# Patient Record
Sex: FEMALE | Race: WHITE | Employment: OTHER | URBAN - NONMETROPOLITAN AREA
[De-identification: names, ages, dates, MRNs, and addresses within clinical notes are randomized per-mention and may not be internally consistent; named-entity substitution may affect disease eponyms.]

---

## 2017-02-21 ENCOUNTER — TELEPHONE (OUTPATIENT)
Dept: PULMONOLOGY | Age: 82
End: 2017-02-21

## 2017-03-02 PROBLEM — J18.9 PNEUMONIA: Status: ACTIVE | Noted: 2017-03-02

## 2017-03-02 PROBLEM — M41.9 RESTRICTIVE LUNG DISEASE DUE TO KYPHOSCOLIOSIS: Status: ACTIVE | Noted: 2017-03-02

## 2017-03-02 PROBLEM — J98.4 RESTRICTIVE LUNG DISEASE DUE TO KYPHOSCOLIOSIS: Status: ACTIVE | Noted: 2017-03-02

## 2017-03-02 PROBLEM — R06.02 SOB (SHORTNESS OF BREATH): Status: ACTIVE | Noted: 2017-03-02

## 2017-03-03 PROBLEM — J18.9 PNEUMONIA: Status: RESOLVED | Noted: 2017-03-02 | Resolved: 2017-03-03

## 2017-03-03 PROBLEM — R06.02 SOB (SHORTNESS OF BREATH): Status: RESOLVED | Noted: 2017-03-02 | Resolved: 2017-03-03

## 2017-07-06 PROBLEM — G93.89 BRAIN MASS: Status: ACTIVE | Noted: 2017-07-06

## 2017-07-06 PROBLEM — R51.9 HEADACHE: Status: ACTIVE | Noted: 2017-07-06

## 2017-07-11 ENCOUNTER — TELEPHONE (OUTPATIENT)
Dept: ADMINISTRATIVE | Age: 82
End: 2017-07-11

## 2017-07-16 ENCOUNTER — HOSPITAL ENCOUNTER (EMERGENCY)
Age: 82
Discharge: HOME | End: 2017-07-17
Payer: MEDICARE

## 2017-07-16 ENCOUNTER — APPOINTMENT (OUTPATIENT)
Dept: GENERAL RADIOLOGY | Age: 82
End: 2017-07-16
Payer: MEDICARE

## 2017-07-16 DIAGNOSIS — R07.9 CHEST PAIN, UNSPECIFIED TYPE: ICD-10-CM

## 2017-07-16 DIAGNOSIS — E87.5 HYPERKALEMIA: Primary | ICD-10-CM

## 2017-07-16 PROCEDURE — 82248 BILIRUBIN DIRECT: CPT

## 2017-07-16 PROCEDURE — 83690 ASSAY OF LIPASE: CPT

## 2017-07-16 PROCEDURE — 71010 XR CHEST PORTABLE: CPT

## 2017-07-16 PROCEDURE — 85025 COMPLETE CBC W/AUTO DIFF WBC: CPT

## 2017-07-16 PROCEDURE — 93005 ELECTROCARDIOGRAM TRACING: CPT | Performed by: NURSE PRACTITIONER

## 2017-07-16 PROCEDURE — 80053 COMPREHEN METABOLIC PANEL: CPT

## 2017-07-16 PROCEDURE — 84484 ASSAY OF TROPONIN QUANT: CPT

## 2017-07-16 PROCEDURE — 99285 EMERGENCY DEPT VISIT HI MDM: CPT

## 2017-07-16 PROCEDURE — 36415 COLL VENOUS BLD VENIPUNCTURE: CPT

## 2017-07-16 ASSESSMENT — PAIN DESCRIPTION - PAIN TYPE: TYPE: ACUTE PAIN

## 2017-07-16 ASSESSMENT — PAIN DESCRIPTION - FREQUENCY: FREQUENCY: CONTINUOUS

## 2017-07-16 ASSESSMENT — PAIN DESCRIPTION - DESCRIPTORS: DESCRIPTORS: PRESSURE

## 2017-07-16 ASSESSMENT — PAIN DESCRIPTION - LOCATION: LOCATION: CHEST

## 2017-07-16 ASSESSMENT — PAIN SCALES - GENERAL: PAINLEVEL_OUTOF10: 5

## 2017-07-16 ASSESSMENT — PAIN DESCRIPTION - ORIENTATION: ORIENTATION: RIGHT

## 2017-07-17 ENCOUNTER — APPOINTMENT (OUTPATIENT)
Dept: ULTRASOUND IMAGING | Age: 82
End: 2017-07-17
Payer: MEDICARE

## 2017-07-17 VITALS
TEMPERATURE: 98.4 F | WEIGHT: 86 LBS | RESPIRATION RATE: 18 BRPM | SYSTOLIC BLOOD PRESSURE: 154 MMHG | HEART RATE: 96 BPM | DIASTOLIC BLOOD PRESSURE: 92 MMHG | HEIGHT: 64 IN | OXYGEN SATURATION: 98 % | BODY MASS INDEX: 14.68 KG/M2

## 2017-07-17 LAB
ALBUMIN SERPL-MCNC: 3.5 G/DL (ref 3.5–5.1)
ALP BLD-CCNC: 93 U/L (ref 38–126)
ALT SERPL-CCNC: 17 U/L (ref 11–66)
ANION GAP SERPL CALCULATED.3IONS-SCNC: 14 MEQ/L (ref 8–16)
ANISOCYTOSIS: ABNORMAL
AST SERPL-CCNC: 32 U/L (ref 5–40)
BASOPHILS # BLD: 0.1 %
BASOPHILS ABSOLUTE: 0 THOU/MM3 (ref 0–0.1)
BILIRUB SERPL-MCNC: 0.2 MG/DL (ref 0.3–1.2)
BILIRUBIN DIRECT: < 0.2 MG/DL (ref 0–0.3)
BUN BLDV-MCNC: 24 MG/DL (ref 7–22)
CALCIUM SERPL-MCNC: 8.9 MG/DL (ref 8.5–10.5)
CHLORIDE BLD-SCNC: 98 MEQ/L (ref 98–111)
CO2: 25 MEQ/L (ref 23–33)
CREAT SERPL-MCNC: 0.9 MG/DL (ref 0.4–1.2)
EKG ATRIAL RATE: 153 BPM
EKG Q-T INTERVAL: 348 MS
EKG QRS DURATION: 76 MS
EKG QTC CALCULATION (BAZETT): 455 MS
EKG R AXIS: 73 DEGREES
EKG T AXIS: 61 DEGREES
EKG VENTRICULAR RATE: 103 BPM
EOSINOPHIL # BLD: 2.3 %
EOSINOPHILS ABSOLUTE: 0.2 THOU/MM3 (ref 0–0.4)
GFR SERPL CREATININE-BSD FRML MDRD: 58 ML/MIN/1.73M2
GLUCOSE BLD-MCNC: 99 MG/DL (ref 70–108)
HCT VFR BLD CALC: 36.3 % (ref 37–47)
HEMOGLOBIN: 12.2 GM/DL (ref 12–16)
LIPASE: 78.4 U/L (ref 5.6–51.3)
LYMPHOCYTES # BLD: 10.2 %
LYMPHOCYTES ABSOLUTE: 1 THOU/MM3 (ref 1–4.8)
MCH RBC QN AUTO: 31.2 PG (ref 27–31)
MCHC RBC AUTO-ENTMCNC: 33.5 GM/DL (ref 33–37)
MCV RBC AUTO: 93.2 FL (ref 81–99)
MONOCYTES # BLD: 8.4 %
MONOCYTES ABSOLUTE: 0.8 THOU/MM3 (ref 0.4–1.3)
NUCLEATED RED BLOOD CELLS: 0 /100 WBC
PDW BLD-RTO: 15.4 % (ref 11.5–14.5)
PLATELET # BLD: 200 THOU/MM3 (ref 130–400)
PMV BLD AUTO: 8.2 MCM (ref 7.4–10.4)
POTASSIUM SERPL-SCNC: 5.3 MEQ/L (ref 3.5–5.2)
RBC # BLD: 3.9 MILL/MM3 (ref 4.2–5.4)
RBC # BLD: NORMAL 10*6/UL
SEG NEUTROPHILS: 79 %
SEGMENTED NEUTROPHILS ABSOLUTE COUNT: 7.9 THOU/MM3 (ref 1.8–7.7)
SODIUM BLD-SCNC: 137 MEQ/L (ref 135–145)
TOTAL PROTEIN: 6.6 G/DL (ref 6.1–8)
TROPONIN T: < 0.01 NG/ML
TROPONIN T: < 0.01 NG/ML
WBC # BLD: 10 THOU/MM3 (ref 4.8–10.8)

## 2017-07-17 PROCEDURE — 96374 THER/PROPH/DIAG INJ IV PUSH: CPT

## 2017-07-17 PROCEDURE — 36415 COLL VENOUS BLD VENIPUNCTURE: CPT

## 2017-07-17 PROCEDURE — 6360000002 HC RX W HCPCS

## 2017-07-17 PROCEDURE — 84484 ASSAY OF TROPONIN QUANT: CPT

## 2017-07-17 PROCEDURE — 76705 ECHO EXAM OF ABDOMEN: CPT

## 2017-07-17 RX ORDER — MORPHINE SULFATE 2 MG/ML
1 INJECTION, SOLUTION INTRAMUSCULAR; INTRAVENOUS ONCE
Status: COMPLETED | OUTPATIENT
Start: 2017-07-17 | End: 2017-07-17

## 2017-07-17 RX ORDER — MORPHINE SULFATE 2 MG/ML
INJECTION, SOLUTION INTRAMUSCULAR; INTRAVENOUS
Status: COMPLETED
Start: 2017-07-17 | End: 2017-07-17

## 2017-07-17 RX ADMIN — MORPHINE SULFATE 1 MG: 2 INJECTION, SOLUTION INTRAMUSCULAR; INTRAVENOUS at 01:06

## 2017-07-17 ASSESSMENT — ENCOUNTER SYMPTOMS
VOMITING: 0
WHEEZING: 0
COUGH: 0
EYE PAIN: 0
SHORTNESS OF BREATH: 1
SORE THROAT: 0
DIARRHEA: 0
ABDOMINAL PAIN: 1
NAUSEA: 0
RHINORRHEA: 0
BACK PAIN: 1
EYE DISCHARGE: 0

## 2017-07-17 ASSESSMENT — PAIN SCALES - GENERAL: PAINLEVEL_OUTOF10: 5

## 2017-09-11 ENCOUNTER — HOSPITAL ENCOUNTER (OUTPATIENT)
Dept: MRI IMAGING | Age: 82
Discharge: HOME | End: 2017-09-11
Payer: MEDICARE

## 2017-09-11 DIAGNOSIS — G93.9 CEREBELLAR LESION: ICD-10-CM

## 2017-09-11 DIAGNOSIS — G93.89 BRAIN MASS: ICD-10-CM

## 2017-09-11 DIAGNOSIS — G93.9 CEREBRAL LESION: ICD-10-CM

## 2017-09-11 PROCEDURE — A9579 GAD-BASE MR CONTRAST NOS,1ML: HCPCS | Performed by: NEUROLOGICAL SURGERY

## 2017-09-11 PROCEDURE — 6360000004 HC RX CONTRAST MEDICATION: Performed by: NEUROLOGICAL SURGERY

## 2017-09-11 PROCEDURE — 70553 MRI BRAIN STEM W/O & W/DYE: CPT

## 2017-09-11 RX ADMIN — GADOTERIDOL: 279.3 INJECTION, SOLUTION INTRAVENOUS at 11:09

## 2017-10-01 PROBLEM — G93.9 CEREBELLAR LESION: Status: ACTIVE | Noted: 2017-07-06

## 2017-10-16 ENCOUNTER — OFFICE VISIT (OUTPATIENT)
Dept: PULMONOLOGY | Age: 82
End: 2017-10-16
Payer: MEDICARE

## 2017-10-16 VITALS
BODY MASS INDEX: 15.59 KG/M2 | HEART RATE: 57 BPM | SYSTOLIC BLOOD PRESSURE: 140 MMHG | OXYGEN SATURATION: 94 % | WEIGHT: 88 LBS | RESPIRATION RATE: 12 BRPM | HEIGHT: 63 IN | DIASTOLIC BLOOD PRESSURE: 74 MMHG

## 2017-10-16 DIAGNOSIS — R91.8 LUNG NODULE, MULTIPLE: ICD-10-CM

## 2017-10-16 DIAGNOSIS — R09.82 PND (POST-NASAL DRIP): ICD-10-CM

## 2017-10-16 DIAGNOSIS — J47.9 BRONCHIECTASIS WITHOUT COMPLICATION (HCC): Primary | ICD-10-CM

## 2017-10-16 DIAGNOSIS — J98.4 RESTRICTIVE LUNG DISEASE DUE TO KYPHOSCOLIOSIS: ICD-10-CM

## 2017-10-16 DIAGNOSIS — J41.1 MUCOPURULENT CHRONIC BRONCHITIS (HCC): ICD-10-CM

## 2017-10-16 DIAGNOSIS — M41.9 RESTRICTIVE LUNG DISEASE DUE TO KYPHOSCOLIOSIS: ICD-10-CM

## 2017-10-16 PROBLEM — R51.9 HEADACHE: Status: RESOLVED | Noted: 2017-07-06 | Resolved: 2017-10-16

## 2017-10-16 PROCEDURE — 99213 OFFICE O/P EST LOW 20 MIN: CPT | Performed by: PHYSICIAN ASSISTANT

## 2017-10-16 RX ORDER — GUAIFENESIN 600 MG/1
1200 TABLET, EXTENDED RELEASE ORAL 2 TIMES DAILY
Qty: 120 TABLET | Refills: 3 | Status: SHIPPED | OUTPATIENT
Start: 2017-10-16

## 2017-10-16 RX ORDER — FLUTICASONE PROPIONATE 50 MCG
1 SPRAY, SUSPENSION (ML) NASAL DAILY
Qty: 1 BOTTLE | Refills: 6 | Status: SHIPPED | OUTPATIENT
Start: 2017-10-16 | End: 2018-05-29 | Stop reason: ALTCHOICE

## 2017-10-16 ASSESSMENT — ENCOUNTER SYMPTOMS
EYES NEGATIVE: 1
GASTROINTESTINAL NEGATIVE: 1
HEMOPTYSIS: 0
VOMITING: 0
NAUSEA: 0
SPUTUM PRODUCTION: 1
WHEEZING: 0
COUGH: 1
HEARTBURN: 0
SHORTNESS OF BREATH: 0
SORE THROAT: 0
BACK PAIN: 0

## 2017-10-16 NOTE — PROGRESS NOTES
heard.  Pulmonary/Chest: Effort normal. No respiratory distress. She has no wheezes. She has no rales. Diminished    Abdominal: Bowel sounds are normal. She exhibits no distension. There is no tenderness. Musculoskeletal: Normal range of motion. She exhibits no edema or tenderness. Kyphoscoliosis    Neurological: She is alert and oriented to person, place, and time. Skin: Skin is warm and dry. No rash noted. Psychiatric: Affect and judgment normal.   Nursing note and vitals reviewed. Test results   CT chest Jul 7, 2017  1. COPD 2. Several scattered subcentimeter nodules in the lungs as detailed above in addition to the more focal area of atelectasis scar or mass in the medial left base. Somewhat favor this is atelectasis. 3. Reticular nodular pattern in the lungs particularly the lower lobes. This is suspicious for an acute inflammatory infectious process. 4. Bronchiectasis and chronic bronchitic changes in both lower lungs. Assessment     1. Bronchiectasis without complication ([address]  CT CHEST WO CONTRAST   2. Restrictive lung disease due to kyphoscoliosis  CT CHEST WO CONTRAST   3. Lung nodule, multiple     4.  PND (post-nasal drip)           Plan   Will add acapella to assist with sputum production- she has failed Mucinex alone and has muscle weakness  Will use Mucinex with Acapella  Repeat CT chest in 3 months to ensure stability of changes in LLL  Will use Flonase for PND  Will see Oswaldo N[facility] back in: 3 months    Libertad Summers  10/16/2017

## 2018-01-31 ENCOUNTER — HOSPITAL ENCOUNTER (OUTPATIENT)
Dept: MRI IMAGING | Age: 83
Discharge: HOME | End: 2018-01-31
Payer: MEDICARE

## 2018-01-31 DIAGNOSIS — D18.02: ICD-10-CM

## 2018-01-31 DIAGNOSIS — R51.9 INTRACTABLE EPISODIC HEADACHE, UNSPECIFIED HEADACHE TYPE: ICD-10-CM

## 2018-01-31 PROCEDURE — 70551 MRI BRAIN STEM W/O DYE: CPT

## 2018-03-07 ENCOUNTER — HOSPITAL ENCOUNTER (OUTPATIENT)
Dept: CT IMAGING | Age: 83
Discharge: HOME | End: 2018-03-07
Payer: MEDICARE

## 2018-03-07 DIAGNOSIS — J98.4 RESTRICTIVE LUNG DISEASE DUE TO KYPHOSCOLIOSIS: ICD-10-CM

## 2018-03-07 DIAGNOSIS — M41.9 RESTRICTIVE LUNG DISEASE DUE TO KYPHOSCOLIOSIS: ICD-10-CM

## 2018-03-07 DIAGNOSIS — J47.9 BRONCHIECTASIS WITHOUT COMPLICATION (HCC): ICD-10-CM

## 2018-03-07 PROCEDURE — 71250 CT THORAX DX C-: CPT

## 2018-03-27 ENCOUNTER — OFFICE VISIT (OUTPATIENT)
Dept: PULMONOLOGY | Age: 83
End: 2018-03-27
Payer: MEDICARE

## 2018-03-27 VITALS
WEIGHT: 92 LBS | HEART RATE: 70 BPM | BODY MASS INDEX: 16.3 KG/M2 | DIASTOLIC BLOOD PRESSURE: 94 MMHG | SYSTOLIC BLOOD PRESSURE: 152 MMHG | OXYGEN SATURATION: 93 % | HEIGHT: 63 IN

## 2018-03-27 DIAGNOSIS — J41.1 MUCOPURULENT CHRONIC BRONCHITIS (HCC): Primary | ICD-10-CM

## 2018-03-27 DIAGNOSIS — J47.9 BRONCHIECTASIS WITHOUT COMPLICATION (HCC): ICD-10-CM

## 2018-03-27 DIAGNOSIS — R91.8 LUNG MASS: ICD-10-CM

## 2018-03-27 DIAGNOSIS — M41.9 RESTRICTIVE LUNG DISEASE DUE TO KYPHOSCOLIOSIS: ICD-10-CM

## 2018-03-27 DIAGNOSIS — J98.4 RESTRICTIVE LUNG DISEASE DUE TO KYPHOSCOLIOSIS: ICD-10-CM

## 2018-03-27 PROCEDURE — 99213 OFFICE O/P EST LOW 20 MIN: CPT | Performed by: PHYSICIAN ASSISTANT

## 2018-03-27 RX ORDER — METOPROLOL TARTRATE 50 MG/1
50 TABLET, FILM COATED ORAL 2 TIMES DAILY
COMMUNITY

## 2018-03-27 RX ORDER — BENZONATATE 200 MG/1
200 CAPSULE ORAL 3 TIMES DAILY PRN
COMMUNITY
End: 2018-05-29 | Stop reason: ALTCHOICE

## 2018-03-27 RX ORDER — LOSARTAN POTASSIUM 50 MG/1
50 TABLET ORAL DAILY
Status: ON HOLD | COMMUNITY
End: 2018-06-10 | Stop reason: HOSPADM

## 2018-03-27 RX ORDER — LORATADINE 10 MG/1
10 TABLET ORAL DAILY
COMMUNITY

## 2018-03-27 ASSESSMENT — ENCOUNTER SYMPTOMS
SHORTNESS OF BREATH: 1
WHEEZING: 1
SINUS PAIN: 0
NAUSEA: 0
SORE THROAT: 0
HEARTBURN: 0
ORTHOPNEA: 0
GASTROINTESTINAL NEGATIVE: 1
EYES NEGATIVE: 1
COUGH: 1
SPUTUM PRODUCTION: 1

## 2018-03-27 NOTE — PROGRESS NOTES
[place] [facility]    Patient: Shaheed Jimenez, 80 y.o.   : 1924  3/27/2018    Pt of Dr. Greg Campos   Patient presents with    COPD     3-4 mo f/u CT 3/7/18        HPI  Quince Gitelman is here for follow up for bronchiectasis. She haley been having issues with cough and sputum since Nov. She was treated with 2 rounds of ATB and just in the past 2 weeks she has felt better. She was suppose to start Acapella after last visit but did not fill script. She feels jittery if uses albuterol. Past Medical hx   PMH:  Past Medical History:   Diagnosis Date    Bronchiectasis ([address]     GERD (gastroesophageal reflux disease)     Hypertension     Peptic ulcer disease     Pneumonia     Scoliosis      SURGICAL HISTORY:  Past Surgical History:   Procedure Laterality Date    APPENDECTOMY      BACK SURGERY      lumbar    COLONOSCOPY      ENDOSCOPY, COLON, DIAGNOSTIC      EYE SURGERY      bilateral cataracts    GASTRECTOMY      partial    HYSTERECTOMY       SOCIAL HISTORY:  Social History   Substance Use Topics    Smoking status: Never Smoker    Smokeless tobacco: Never Used    Alcohol use No     ALLERGIES:No Known Allergies  FAMILY HISTORY:  Family History   Problem Relation Age of Onset    Asthma Child      CURRENT MEDICATIONS:  Current Outpatient Prescriptions   Medication Sig Dispense Refill    benzonatate (TESSALON) 200 MG capsule Take 200 mg by mouth 3 times daily as needed for Cough      Multiple Vitamins-Minerals (PRESERVISION AREDS 2 PO) Take by mouth      loratadine (CLARITIN) 10 MG tablet Take 10 mg by mouth daily      metoprolol tartrate (LOPRESSOR) 50 MG tablet Take 50 mg by mouth 2 times daily      losartan (COZAAR) 50 MG tablet Take 50 mg by mouth daily      Misc.  Devices (ACAPELLA) MISC 1 Device by Does not apply route 4 times daily 1 each 0    mometasone (ASMANEX 120 METERED DOSES) 220 MCG/INH inhaler Inhale 2 puffs into the lungs daily 1 Inhaler 11    fluticasone (FLONASE) 50 MCG/ACT nasal spray 1 spray by Nasal route daily 1 Bottle 6    guaiFENesin (MUCINEX) 600 MG extended release tablet Take 2 tablets by mouth 2 times daily (Patient taking differently: Take 1,200 mg by mouth daily ) 120 tablet 3    desoximetasone (TOPICORT) 0.25 % cream as needed Apply topically 2 times daily.  mometasone (ELOCON) 0.1 % lotion Apply 1 drop topically daily as needed to scalp      triamcinolone (KENALOG) 0.025 % LOTN lotion Apply topically 4 times daily as needed       omeprazole (PRILOSEC) 20 MG capsule Take 20 mg by mouth daily       Wheat Dextrin (BENEFIBER PO) Take by mouth daily as needed       aspirin 81 MG tablet Take 81 mg by mouth daily.  Probiotic Product (PROBIOTIC DAILY PO) Take  by mouth.  b complex vitamins capsule Take 1 capsule by mouth daily. No current facility-administered medications for this visit. Oralia JEFF   Review of Systems   Constitutional: Negative. Negative for chills and fever. HENT: Negative. Negative for congestion, nosebleeds, sinus pain and sore throat. Eyes: Negative. Respiratory: Positive for cough, sputum production, shortness of breath and wheezing. Cardiovascular: Negative. Negative for chest pain, orthopnea and PND. Gastrointestinal: Negative. Negative for heartburn and nausea. Genitourinary: Negative. Musculoskeletal: Negative. Negative for joint pain and myalgias. Skin: Negative. Neurological: Negative. Negative for dizziness, weakness and headaches. Endo/Heme/Allergies: Negative. Psychiatric/Behavioral: Negative. Negative for depression. The patient is not nervous/anxious and does not have insomnia. All other systems reviewed and are negative.        Physical exam   BP (!) 152/94 (Site: Left Arm, Position: Sitting)   Pulse 70   Ht 5' 3\" (1.6 m)   Wt 92 lb (41.7 kg)   SpO2 93%   BMI 16.30 kg/m²    Neck Circumference -   13  Mallampati - 1    Physical Exam   Constitutional: She is oriented to person, place, and time. No distress. frail   HENT:   Head: Normocephalic and atraumatic. Mouth/Throat: Oropharynx is clear and moist. No oropharyngeal exudate. Eyes: Pupils are equal, round, and reactive to light. Neck: Normal range of motion. Neck supple. Cardiovascular: Normal rate, regular rhythm and normal heart sounds. Pulmonary/Chest: Effort normal. No stridor. No respiratory distress. Squeaks and rhonchi in the bases. Abdominal: Soft. Bowel sounds are normal.   Musculoskeletal: Normal range of motion. She exhibits no edema. Kyphoscoliosis    Neurological: She is alert and oriented to person, place, and time. Gait normal.   Skin: Skin is warm and dry. She is not diaphoretic. Psychiatric: Mood, memory, affect and judgment normal.        Test results   CT chest 3/07/2018  1. There is bronchiectasis demonstrated at the upper and lower lung zones bilaterally. 2. Focal ovoid opacity at the medial left lung base measuring 3.3 x 2.1 cm appears increased in size from the prior examination dated 7/7/2017 previously measuring 3 x 1.8 cm. Differential considerations would include focal round atelectasis versus an inflammatory or infectious process or a neoplastic process. Further evaluation can be obtained with PET/CT and/or biopsy. 3. There is an irregular confluent opacity at the medial right lung base which measures 1.3 x 1.9 cm. This is indeterminate in etiology and may represent an inflammatory or infectious process. However, recommend continued follow-up to document resolution. 4. More focal dependent consolidative airspace disease at the right lung base may represent atelectasis or pneumonia. 5. Scattered small micronodular densities are demonstrated at the lower lobes bilaterally. This may be related to an inflammatory or infectious process to include an atypical mycobacterial infectious process.    6. There is focal opacity

## 2018-05-29 ENCOUNTER — APPOINTMENT (OUTPATIENT)
Dept: GENERAL RADIOLOGY | Age: 83
DRG: 194 | End: 2018-05-29
Payer: MEDICARE

## 2018-05-29 ENCOUNTER — APPOINTMENT (OUTPATIENT)
Dept: CT IMAGING | Age: 83
DRG: 194 | End: 2018-05-29
Payer: MEDICARE

## 2018-05-29 ENCOUNTER — HOSPITAL ENCOUNTER (INPATIENT)
Age: 83
LOS: 3 days | Discharge: NURSING, REHAB OR LONG-TERM CARE | DRG: 194 | End: 2018-06-01
Attending: FAMILY MEDICINE | Admitting: INTERNAL MEDICINE
Payer: MEDICARE

## 2018-05-29 DIAGNOSIS — J18.9 PNEUMONIA DUE TO ORGANISM: ICD-10-CM

## 2018-05-29 DIAGNOSIS — E87.1 HYPONATREMIA: Primary | ICD-10-CM

## 2018-05-29 LAB
ALBUMIN SERPL-MCNC: 3.5 G/DL (ref 3.5–5.1)
ALP BLD-CCNC: 110 U/L (ref 38–126)
ALT SERPL-CCNC: 11 U/L (ref 11–66)
ANION GAP SERPL CALCULATED.3IONS-SCNC: 13 MEQ/L (ref 8–16)
ANISOCYTOSIS: ABNORMAL
AST SERPL-CCNC: 23 U/L (ref 5–40)
BASOPHILS # BLD: 0.3 %
BASOPHILS ABSOLUTE: 0 THOU/MM3 (ref 0–0.1)
BILIRUB SERPL-MCNC: 0.4 MG/DL (ref 0.3–1.2)
BILIRUBIN DIRECT: < 0.2 MG/DL (ref 0–0.3)
BUN BLDV-MCNC: 21 MG/DL (ref 7–22)
CALCIUM SERPL-MCNC: 8.7 MG/DL (ref 8.5–10.5)
CHLORIDE BLD-SCNC: 85 MEQ/L (ref 98–111)
CO2: 22 MEQ/L (ref 23–33)
CREAT SERPL-MCNC: 1.2 MG/DL (ref 0.4–1.2)
CREATININE URINE: 112.9 MG/DL
EKG ATRIAL RATE: 90 BPM
EKG P AXIS: 88 DEGREES
EKG P-R INTERVAL: 152 MS
EKG Q-T INTERVAL: 348 MS
EKG QRS DURATION: 86 MS
EKG QTC CALCULATION (BAZETT): 425 MS
EKG R AXIS: 68 DEGREES
EKG T AXIS: 69 DEGREES
EKG VENTRICULAR RATE: 90 BPM
EOSINOPHIL # BLD: 0.1 %
EOSINOPHILS ABSOLUTE: 0 THOU/MM3 (ref 0–0.4)
GFR SERPL CREATININE-BSD FRML MDRD: 42 ML/MIN/1.73M2
GLUCOSE BLD-MCNC: 124 MG/DL (ref 70–108)
HCT VFR BLD CALC: 39.1 % (ref 37–47)
HEMOGLOBIN: 13.3 GM/DL (ref 12–16)
LACTIC ACID: 1.1 MMOL/L (ref 0.5–2.2)
LV EF: 70 %
LVEF MODALITY: NORMAL
LYMPHOCYTES # BLD: 4.7 %
LYMPHOCYTES ABSOLUTE: 0.5 THOU/MM3 (ref 1–4.8)
MCH RBC QN AUTO: 29.4 PG (ref 27–31)
MCHC RBC AUTO-ENTMCNC: 33.9 GM/DL (ref 33–37)
MCV RBC AUTO: 86.6 FL (ref 81–99)
MONOCYTES # BLD: 7.9 %
MONOCYTES ABSOLUTE: 0.8 THOU/MM3 (ref 0.4–1.3)
NUCLEATED RED BLOOD CELLS: 0 /100 WBC
OSMOLALITY CALCULATION: 246.6 MOSMOL/KG (ref 275–300)
OSMOLALITY: 272 MOSMOL/KG (ref 275–295)
PDW BLD-RTO: 14.6 % (ref 11.5–14.5)
PLATELET # BLD: 205 THOU/MM3 (ref 130–400)
PMV BLD AUTO: 7.9 FL (ref 7.4–10.4)
POTASSIUM SERPL-SCNC: 5.8 MEQ/L (ref 3.5–5.2)
PRO-BNP: 1417 PG/ML (ref 0–1800)
PROCALCITONIN: 0.11 NG/ML (ref 0.01–0.09)
RBC # BLD: 4.52 MILL/MM3 (ref 4.2–5.4)
SEG NEUTROPHILS: 87 %
SEGMENTED NEUTROPHILS ABSOLUTE COUNT: 9.3 THOU/MM3 (ref 1.8–7.7)
SODIUM BLD-SCNC: 120 MEQ/L (ref 135–145)
SODIUM URINE: 41 MEQ/L
TOTAL PROTEIN: 7 G/DL (ref 6.1–8)
TROPONIN T: < 0.01 NG/ML
URIC ACID: 5 MG/DL (ref 2.4–5.7)
WBC # BLD: 10.7 THOU/MM3 (ref 4.8–10.8)

## 2018-05-29 PROCEDURE — 89190 NASAL SMEAR FOR EOSINOPHILS: CPT

## 2018-05-29 PROCEDURE — 83880 ASSAY OF NATRIURETIC PEPTIDE: CPT

## 2018-05-29 PROCEDURE — 1200000003 HC TELEMETRY R&B

## 2018-05-29 PROCEDURE — 94640 AIRWAY INHALATION TREATMENT: CPT

## 2018-05-29 PROCEDURE — 85025 COMPLETE CBC W/AUTO DIFF WBC: CPT

## 2018-05-29 PROCEDURE — 36415 COLL VENOUS BLD VENIPUNCTURE: CPT

## 2018-05-29 PROCEDURE — 71250 CT THORAX DX C-: CPT

## 2018-05-29 PROCEDURE — 6360000002 HC RX W HCPCS: Performed by: FAMILY MEDICINE

## 2018-05-29 PROCEDURE — 6360000002 HC RX W HCPCS: Performed by: INTERNAL MEDICINE

## 2018-05-29 PROCEDURE — 99285 EMERGENCY DEPT VISIT HI MDM: CPT

## 2018-05-29 PROCEDURE — 93005 ELECTROCARDIOGRAM TRACING: CPT | Performed by: FAMILY MEDICINE

## 2018-05-29 PROCEDURE — 81001 URINALYSIS AUTO W/SCOPE: CPT

## 2018-05-29 PROCEDURE — 2580000003 HC RX 258: Performed by: INTERNAL MEDICINE

## 2018-05-29 PROCEDURE — 84300 ASSAY OF URINE SODIUM: CPT

## 2018-05-29 PROCEDURE — 84145 PROCALCITONIN (PCT): CPT

## 2018-05-29 PROCEDURE — 2580000003 HC RX 258: Performed by: FAMILY MEDICINE

## 2018-05-29 PROCEDURE — 99221 1ST HOSP IP/OBS SF/LOW 40: CPT | Performed by: INTERNAL MEDICINE

## 2018-05-29 PROCEDURE — 6370000000 HC RX 637 (ALT 250 FOR IP): Performed by: FAMILY MEDICINE

## 2018-05-29 PROCEDURE — 99223 1ST HOSP IP/OBS HIGH 75: CPT | Performed by: INTERNAL MEDICINE

## 2018-05-29 PROCEDURE — 6370000000 HC RX 637 (ALT 250 FOR IP): Performed by: INTERNAL MEDICINE

## 2018-05-29 PROCEDURE — 84133 ASSAY OF URINE POTASSIUM: CPT

## 2018-05-29 PROCEDURE — 96375 TX/PRO/DX INJ NEW DRUG ADDON: CPT

## 2018-05-29 PROCEDURE — 83605 ASSAY OF LACTIC ACID: CPT

## 2018-05-29 PROCEDURE — 83930 ASSAY OF BLOOD OSMOLALITY: CPT

## 2018-05-29 PROCEDURE — 84550 ASSAY OF BLOOD/URIC ACID: CPT

## 2018-05-29 PROCEDURE — 96374 THER/PROPH/DIAG INJ IV PUSH: CPT

## 2018-05-29 PROCEDURE — 87086 URINE CULTURE/COLONY COUNT: CPT

## 2018-05-29 PROCEDURE — 71045 X-RAY EXAM CHEST 1 VIEW: CPT

## 2018-05-29 PROCEDURE — 93306 TTE W/DOPPLER COMPLETE: CPT

## 2018-05-29 PROCEDURE — 82248 BILIRUBIN DIRECT: CPT

## 2018-05-29 PROCEDURE — 83874 ASSAY OF MYOGLOBIN: CPT

## 2018-05-29 PROCEDURE — 84484 ASSAY OF TROPONIN QUANT: CPT

## 2018-05-29 PROCEDURE — 87040 BLOOD CULTURE FOR BACTERIA: CPT

## 2018-05-29 PROCEDURE — 80053 COMPREHEN METABOLIC PANEL: CPT

## 2018-05-29 PROCEDURE — 82570 ASSAY OF URINE CREATININE: CPT

## 2018-05-29 PROCEDURE — 83935 ASSAY OF URINE OSMOLALITY: CPT

## 2018-05-29 RX ORDER — PANTOPRAZOLE SODIUM 40 MG/1
40 TABLET, DELAYED RELEASE ORAL
Status: DISCONTINUED | OUTPATIENT
Start: 2018-05-30 | End: 2018-06-01 | Stop reason: HOSPADM

## 2018-05-29 RX ORDER — DEXTROSE MONOHYDRATE 25 G/50ML
25 INJECTION, SOLUTION INTRAVENOUS ONCE
Status: COMPLETED | OUTPATIENT
Start: 2018-05-29 | End: 2018-05-29

## 2018-05-29 RX ORDER — IPRATROPIUM BROMIDE AND ALBUTEROL SULFATE 2.5; .5 MG/3ML; MG/3ML
1 SOLUTION RESPIRATORY (INHALATION) ONCE
Status: DISCONTINUED | OUTPATIENT
Start: 2018-05-29 | End: 2018-05-29

## 2018-05-29 RX ORDER — SODIUM CHLORIDE 9 MG/ML
INJECTION, SOLUTION INTRAVENOUS CONTINUOUS
Status: ACTIVE | OUTPATIENT
Start: 2018-05-29 | End: 2018-05-30

## 2018-05-29 RX ORDER — ASPIRIN 81 MG/1
81 TABLET, CHEWABLE ORAL DAILY
Status: DISCONTINUED | OUTPATIENT
Start: 2018-05-30 | End: 2018-06-01 | Stop reason: HOSPADM

## 2018-05-29 RX ORDER — SODIUM CHLORIDE 0.9 % (FLUSH) 0.9 %
10 SYRINGE (ML) INJECTION PRN
Status: DISCONTINUED | OUTPATIENT
Start: 2018-05-29 | End: 2018-06-01 | Stop reason: HOSPADM

## 2018-05-29 RX ORDER — LEVALBUTEROL 1.25 MG/.5ML
1.25 SOLUTION, CONCENTRATE RESPIRATORY (INHALATION) ONCE
Status: COMPLETED | OUTPATIENT
Start: 2018-05-29 | End: 2018-05-29

## 2018-05-29 RX ORDER — METOPROLOL TARTRATE 50 MG/1
50 TABLET, FILM COATED ORAL 2 TIMES DAILY
Status: DISCONTINUED | OUTPATIENT
Start: 2018-05-29 | End: 2018-06-01 | Stop reason: HOSPADM

## 2018-05-29 RX ORDER — ONDANSETRON 2 MG/ML
4 INJECTION INTRAMUSCULAR; INTRAVENOUS EVERY 6 HOURS PRN
Status: DISCONTINUED | OUTPATIENT
Start: 2018-05-29 | End: 2018-06-01 | Stop reason: HOSPADM

## 2018-05-29 RX ORDER — VITAMIN B COMPLEX
1 CAPSULE ORAL DAILY
Status: DISCONTINUED | OUTPATIENT
Start: 2018-05-29 | End: 2018-05-29 | Stop reason: CLARIF

## 2018-05-29 RX ORDER — LACTOBACILLUS RHAMNOSUS GG 10B CELL
1 CAPSULE ORAL DAILY
Status: DISCONTINUED | OUTPATIENT
Start: 2018-05-30 | End: 2018-06-01 | Stop reason: HOSPADM

## 2018-05-29 RX ORDER — DOXYCYCLINE HYCLATE 100 MG
100 TABLET ORAL EVERY 12 HOURS SCHEDULED
Status: DISCONTINUED | OUTPATIENT
Start: 2018-05-29 | End: 2018-06-01 | Stop reason: HOSPADM

## 2018-05-29 RX ORDER — SODIUM POLYSTYRENE SULFONATE 15 G/60ML
30 SUSPENSION ORAL; RECTAL ONCE
Status: COMPLETED | OUTPATIENT
Start: 2018-05-29 | End: 2018-05-29

## 2018-05-29 RX ORDER — METHYLPREDNISOLONE SODIUM SUCCINATE 125 MG/2ML
125 INJECTION, POWDER, LYOPHILIZED, FOR SOLUTION INTRAMUSCULAR; INTRAVENOUS ONCE
Status: COMPLETED | OUTPATIENT
Start: 2018-05-29 | End: 2018-05-29

## 2018-05-29 RX ORDER — M-VIT,TX,IRON,MINS/CALC/FOLIC 27MG-0.4MG
1 TABLET ORAL DAILY
Status: DISCONTINUED | OUTPATIENT
Start: 2018-05-30 | End: 2018-06-01 | Stop reason: HOSPADM

## 2018-05-29 RX ORDER — SODIUM CHLORIDE 0.9 % (FLUSH) 0.9 %
10 SYRINGE (ML) INJECTION EVERY 12 HOURS SCHEDULED
Status: DISCONTINUED | OUTPATIENT
Start: 2018-05-29 | End: 2018-06-01 | Stop reason: HOSPADM

## 2018-05-29 RX ORDER — GUAIFENESIN 600 MG/1
1200 TABLET, EXTENDED RELEASE ORAL DAILY
Status: DISCONTINUED | OUTPATIENT
Start: 2018-05-30 | End: 2018-06-01 | Stop reason: HOSPADM

## 2018-05-29 RX ORDER — LEVOFLOXACIN 5 MG/ML
750 INJECTION, SOLUTION INTRAVENOUS ONCE
Status: DISCONTINUED | OUTPATIENT
Start: 2018-05-29 | End: 2018-05-29

## 2018-05-29 RX ADMIN — Medication 4 PUFF: at 22:05

## 2018-05-29 RX ADMIN — SODIUM CHLORIDE: 9 INJECTION, SOLUTION INTRAVENOUS at 15:23

## 2018-05-29 RX ADMIN — METHYLPREDNISOLONE SODIUM SUCCINATE 125 MG: 125 INJECTION, POWDER, FOR SOLUTION INTRAMUSCULAR; INTRAVENOUS at 11:01

## 2018-05-29 RX ADMIN — METOPROLOL TARTRATE 50 MG: 50 TABLET, FILM COATED ORAL at 21:25

## 2018-05-29 RX ADMIN — INSULIN HUMAN 10 UNITS: 100 INJECTION, SOLUTION PARENTERAL at 12:21

## 2018-05-29 RX ADMIN — DEXTROSE MONOHYDRATE 25 G: 25 INJECTION, SOLUTION INTRAVENOUS at 12:20

## 2018-05-29 RX ADMIN — SODIUM POLYSTYRENE SULFONATE 30 G: 15 SUSPENSION ORAL; RECTAL at 12:20

## 2018-05-29 RX ADMIN — ENOXAPARIN SODIUM 30 MG: 30 INJECTION SUBCUTANEOUS at 21:04

## 2018-05-29 RX ADMIN — LEVALBUTEROL HYDROCHLORIDE 1.25 MG: 1.25 SOLUTION, CONCENTRATE RESPIRATORY (INHALATION) at 10:50

## 2018-05-29 RX ADMIN — DOXYCYCLINE HYCLATE 100 MG: 100 TABLET, COATED ORAL at 21:25

## 2018-05-29 ASSESSMENT — ENCOUNTER SYMPTOMS
DIARRHEA: 0
EYE PAIN: 0
RHINORRHEA: 0
SHORTNESS OF BREATH: 1
ABDOMINAL PAIN: 0
BACK PAIN: 0
VOMITING: 0
NAUSEA: 0
EYE DISCHARGE: 0
SORE THROAT: 0
WHEEZING: 0
COUGH: 1

## 2018-05-29 ASSESSMENT — PAIN SCALES - GENERAL: PAINLEVEL_OUTOF10: 0

## 2018-05-29 NOTE — ED TRIAGE NOTES
Patient presents to ED with complaint of shortness of breath and fatigue. Family reports patient has had a cough and congestion x1 year. Report patient has been weak and had decreased intake the last 4 days. Patient lives alone and family is concerned because they have had to catch her multiple times when she almost falls. Family believes patient's breathing is labored - patient denies shortness of breath. Patient states \"I think my right ear hurts a little\". States \"I just feel weak\".

## 2018-05-29 NOTE — PROGRESS NOTES
Pt admitted to  4K6 from ED at 1445. Complaints: Shortness of breath fatigue and hyponatremia. IV site free of s/s of infection or infiltration. Vital signs obtained. Assessment and data collection initiated. Oriented to room. All questions answered with no further questions at this time. Fall prevention and safety brochure discussed with patient. Attempted to obtain urine specimen without success due to contaminated with stool due to incontinence. Will attempt to obtain urine specimen when patient is able. The best day to schedule a follow up Dr appointment is:  Monday aOksanamOksana Chamorro RN 5/29/2018 2:56 PM

## 2018-05-29 NOTE — H&P
1107   AST  23   ALT  11   BILIDIR  <0.2   BILITOT  0.4   ALKPHOS  110     No results for input(s): INR in the last 72 hours. No results for input(s): Claretha Rocker in the last 72 hours. Urinalysis:      Lab Results   Component Value Date    NITRU NEGATIVE 01/31/2016    WBCUA 0-2 01/31/2016    BACTERIA NONE 01/31/2016    RBCUA 0-2 01/31/2016    BLOODU NEGATIVE 01/31/2016    GLUCOSEU NEGATIVE 01/31/2016       Radiology:     CXR: I have reviewed the CXR with the following interpretation:   Rotated film, scoliosis present, Suspicion for pulmonary edema    EKG:  I have reviewed the EKG with the following interpretation: sinus rhythm with LVH criteria    XR CHEST PORTABLE   Final Result   1. Left mid and bilateral lower lung atelectasis/infiltrate. 2. Prominent pulmonary interstitium suspicious for pulmonary edema. **This report has been created using voice recognition software. It may contain minor errors which are inherent in voice recognition technology. **      Final report electronically signed by Dr. Ann Marie Aldridge on 5/29/2018 11:27 AM      CT CHEST WO CONTRAST    (Results Pending)            DVT prophylaxis: [x] Lovenox                                 [] SCDs                                 [] SQ Heparin                                 [] Encourage ambulation           [] Already on Anticoagulation    Code Status: Prior      PT/OT Eval Status:  Once more stable    Disposition:    [] Home       [] TCU       [] Rehab       [] Psych       [x] SNF       [] [place]       [] Other-    ASSESSMENT and PLAN:    Generalized weakness and fatigue could be from hyponatremia and dehydration  Physical therapy once I pronate anemia improves    Hyponatremia: appears to be more dehydrated, will start on gentle hydration normal saline at 50 mL per hour, get serum osmolality and urine osmolality, nephrology consultation  Chest x-ray suggests pulmonary edema  Will get a BNP, follow CT scan of

## 2018-05-29 NOTE — ED PROVIDER NOTES
drink alcohol or use drugs. PHYSICAL EXAM     INITIAL VITALS:  height is 5' 3\" (1.6 m) and weight is 82 lb (37.2 kg). Her oral temperature is 98.9 °F (37.2 °C). Her blood pressure is 133/64 and her pulse is 105. Her respiration is 19 and oxygen saturation is 94%. Physical Exam   Constitutional: She is oriented to person, place, and time. She appears well-developed and well-nourished. No distress. HENT:   Head: Normocephalic and atraumatic. Right Ear: External ear normal.   Left Ear: External ear normal.   Eyes: Conjunctivae are normal.   Neck: Normal range of motion. Neck supple. No thyromegaly present. Cardiovascular: Normal rate, regular rhythm and normal heart sounds. No murmur heard. Pulmonary/Chest: Effort normal and breath sounds normal. No respiratory distress. She has no decreased breath sounds. She has no wheezes. She has no rhonchi. She has no rales. She exhibits no tenderness. Abdominal: Soft. She exhibits no distension. There is no tenderness. There is no rebound and no guarding. Musculoskeletal: Normal range of motion. She exhibits no edema. Neurological: She is alert and oriented to person, place, and time. No cranial nerve deficit. Skin: Skin is warm and dry. No rash noted. She is not diaphoretic. No erythema. No pallor. Psychiatric: She has a normal mood and affect. Her behavior is normal. Judgment and thought content normal.   Nursing note and vitals reviewed. DIFFERENTIAL DIAGNOSIS:   COPD exacerbation, pneumonia, bronchitis     DIAGNOSTIC RESULTS     EKG: All EKG's are interpreted by the Emergency Department Physician who either signs or Co-signs this chart in the absence of a cardiologist.  EKG interpreted by Siddharth Coulter MD:    Vent.  Rate: 90 bpm  AK interval: 152 ms  QRS duration: 86 ms  QTc: 425 ms  P-R-T axes: 88, 68, 69  Sinus rhythm with PAC's   Voltage criteria for left ventricular hypertrophy   No STEMI  Compared to old EKG on 16-JUL-2017    RADIOLOGY:

## 2018-05-29 NOTE — ED NOTES
Patient transported to 4k 06   in stable condition. Patient monitored on telemetry.            Farhana Steinberg LPN  04/99/04 [id number]

## 2018-05-30 PROBLEM — R62.7 FTT (FAILURE TO THRIVE) IN ADULT: Status: ACTIVE | Noted: 2018-05-30

## 2018-05-30 PROBLEM — E87.1 HYPO-OSMOLALITY AND HYPONATREMIA (CODE): Status: ACTIVE | Noted: 2018-05-30

## 2018-05-30 PROBLEM — N17.9 AKI (ACUTE KIDNEY INJURY) (HCC): Status: ACTIVE | Noted: 2018-05-30

## 2018-05-30 PROBLEM — I50.33 ACUTE ON CHRONIC DIASTOLIC CONGESTIVE HEART FAILURE (HCC): Status: ACTIVE | Noted: 2018-05-30

## 2018-05-30 LAB
ALLEN TEST: POSITIVE
AMORPHOUS: ABNORMAL
ANION GAP SERPL CALCULATED.3IONS-SCNC: 15 MEQ/L (ref 8–16)
BACTERIA: ABNORMAL /HPF
BASE EXCESS (CALCULATED): -0.1 MMOL/L (ref -2.5–2.5)
BILIRUBIN URINE: NEGATIVE
BLOOD, URINE: ABNORMAL
BUN BLDV-MCNC: 30 MG/DL (ref 7–22)
CALCIUM SERPL-MCNC: 8.1 MG/DL (ref 8.5–10.5)
CASTS 2: ABNORMAL /LPF
CASTS UA: ABNORMAL /LPF
CHARACTER, URINE: ABNORMAL
CHLORIDE BLD-SCNC: 88 MEQ/L (ref 98–111)
CO2: 21 MEQ/L (ref 23–33)
COLLECTED BY:: ABNORMAL
COLOR: YELLOW
CREAT SERPL-MCNC: 1.4 MG/DL (ref 0.4–1.2)
CRYSTALS, UA: ABNORMAL
DEVICE: ABNORMAL
EKG ATRIAL RATE: 64 BPM
EKG P AXIS: 82 DEGREES
EKG P-R INTERVAL: 168 MS
EKG Q-T INTERVAL: 408 MS
EKG QRS DURATION: 98 MS
EKG QTC CALCULATION (BAZETT): 420 MS
EKG R AXIS: 66 DEGREES
EKG T AXIS: 74 DEGREES
EKG VENTRICULAR RATE: 64 BPM
EOSINOPHIL SMEAR: NORMAL
EPITHELIAL CELLS, UA: ABNORMAL /HPF
GFR SERPL CREATININE-BSD FRML MDRD: 35 ML/MIN/1.73M2
GLUCOSE BLD-MCNC: 136 MG/DL (ref 70–108)
GLUCOSE URINE: 100 MG/DL
HCO3: 25 MMOL/L (ref 23–28)
KETONES, URINE: ABNORMAL
LEUKOCYTE ESTERASE, URINE: ABNORMAL
MAGNESIUM: 1.9 MG/DL (ref 1.6–2.4)
MISCELLANEOUS 2: ABNORMAL
MUCUS: ABNORMAL
MYOGLOBIN URINE: POSITIVE
NITRITE, URINE: NEGATIVE
O2 SATURATION: 93 %
OSMOLALITY URINE: 382 MOSMOL/KG (ref 250–750)
PCO2: 41 MMHG (ref 35–45)
PH BLOOD GAS: 7.4 (ref 7.35–7.45)
PH UA: 5
PO2: 67 MMHG (ref 71–104)
POTASSIUM SERPL-SCNC: 4.6 MEQ/L (ref 3.5–5.2)
POTASSIUM, URINE: 40.8 MEQ/L
PREALBUMIN: 11.2 MG/DL (ref 20–40)
PROCALCITONIN: 0.11 NG/ML (ref 0.01–0.09)
PROCALCITONIN: 0.12 NG/ML (ref 0.01–0.09)
PROTEIN UA: NEGATIVE
RBC URINE: ABNORMAL /HPF
RENAL EPITHELIAL, UA: ABNORMAL
SODIUM BLD-SCNC: 124 MEQ/L (ref 135–145)
SOURCE, BLOOD GAS: ABNORMAL
SPECIFIC GRAVITY, URINE: 1.02 (ref 1–1.03)
SPECIMEN: NORMAL
TSH SERPL DL<=0.05 MIU/L-ACNC: 0.74 UIU/ML (ref 0.4–4.2)
UROBILINOGEN, URINE: 1 EU/DL
WBC UA: ABNORMAL /HPF
YEAST: ABNORMAL

## 2018-05-30 PROCEDURE — 1200000003 HC TELEMETRY R&B

## 2018-05-30 PROCEDURE — 6360000002 HC RX W HCPCS: Performed by: INTERNAL MEDICINE

## 2018-05-30 PROCEDURE — G8988 SELF CARE GOAL STATUS: HCPCS

## 2018-05-30 PROCEDURE — 2580000003 HC RX 258: Performed by: INTERNAL MEDICINE

## 2018-05-30 PROCEDURE — 84145 PROCALCITONIN (PCT): CPT

## 2018-05-30 PROCEDURE — 84443 ASSAY THYROID STIM HORMONE: CPT

## 2018-05-30 PROCEDURE — 83735 ASSAY OF MAGNESIUM: CPT

## 2018-05-30 PROCEDURE — 80048 BASIC METABOLIC PNL TOTAL CA: CPT

## 2018-05-30 PROCEDURE — 84134 ASSAY OF PREALBUMIN: CPT

## 2018-05-30 PROCEDURE — 36415 COLL VENOUS BLD VENIPUNCTURE: CPT

## 2018-05-30 PROCEDURE — 97166 OT EVAL MOD COMPLEX 45 MIN: CPT

## 2018-05-30 PROCEDURE — 99232 SBSQ HOSP IP/OBS MODERATE 35: CPT | Performed by: INTERNAL MEDICINE

## 2018-05-30 PROCEDURE — 97161 PT EVAL LOW COMPLEX 20 MIN: CPT

## 2018-05-30 PROCEDURE — 36600 WITHDRAWAL OF ARTERIAL BLOOD: CPT

## 2018-05-30 PROCEDURE — 6370000000 HC RX 637 (ALT 250 FOR IP): Performed by: FAMILY MEDICINE

## 2018-05-30 PROCEDURE — 82803 BLOOD GASES ANY COMBINATION: CPT

## 2018-05-30 PROCEDURE — APPSS60 APP SPLIT SHARED TIME 46-60 MINUTES: Performed by: NURSE PRACTITIONER

## 2018-05-30 PROCEDURE — G8987 SELF CARE CURRENT STATUS: HCPCS

## 2018-05-30 PROCEDURE — 94669 MECHANICAL CHEST WALL OSCILL: CPT

## 2018-05-30 PROCEDURE — 6370000000 HC RX 637 (ALT 250 FOR IP): Performed by: INTERNAL MEDICINE

## 2018-05-30 PROCEDURE — 97110 THERAPEUTIC EXERCISES: CPT

## 2018-05-30 PROCEDURE — 99223 1ST HOSP IP/OBS HIGH 75: CPT | Performed by: INTERNAL MEDICINE

## 2018-05-30 PROCEDURE — 93005 ELECTROCARDIOGRAM TRACING: CPT | Performed by: INTERNAL MEDICINE

## 2018-05-30 PROCEDURE — 97530 THERAPEUTIC ACTIVITIES: CPT

## 2018-05-30 PROCEDURE — 94640 AIRWAY INHALATION TREATMENT: CPT

## 2018-05-30 PROCEDURE — G8978 MOBILITY CURRENT STATUS: HCPCS

## 2018-05-30 RX ORDER — ALBUTEROL SULFATE 2.5 MG/3ML
2.5 SOLUTION RESPIRATORY (INHALATION)
Status: DISCONTINUED | OUTPATIENT
Start: 2018-05-30 | End: 2018-06-01 | Stop reason: HOSPADM

## 2018-05-30 RX ORDER — FLUDROCORTISONE ACETATE 0.1 MG/1
0.1 TABLET ORAL DAILY
Status: DISCONTINUED | OUTPATIENT
Start: 2018-05-30 | End: 2018-06-01 | Stop reason: HOSPADM

## 2018-05-30 RX ADMIN — Medication 1 CAPSULE: at 08:25

## 2018-05-30 RX ADMIN — PANTOPRAZOLE SODIUM 40 MG: 40 TABLET, DELAYED RELEASE ORAL at 06:23

## 2018-05-30 RX ADMIN — ASPIRIN 81 MG 81 MG: 81 TABLET ORAL at 08:25

## 2018-05-30 RX ADMIN — METOPROLOL TARTRATE 50 MG: 50 TABLET, FILM COATED ORAL at 08:25

## 2018-05-30 RX ADMIN — Medication 2 PUFF: at 09:11

## 2018-05-30 RX ADMIN — ENOXAPARIN SODIUM 30 MG: 30 INJECTION SUBCUTANEOUS at 17:20

## 2018-05-30 RX ADMIN — GUAIFENESIN 1200 MG: 600 TABLET, EXTENDED RELEASE ORAL at 08:25

## 2018-05-30 RX ADMIN — Medication 10 ML: at 21:03

## 2018-05-30 RX ADMIN — DOXYCYCLINE HYCLATE 100 MG: 100 TABLET, COATED ORAL at 21:02

## 2018-05-30 RX ADMIN — FLUDROCORTISONE ACETATE 0.1 MG: 0.1 TABLET ORAL at 08:28

## 2018-05-30 RX ADMIN — Medication 2 PUFF: at 20:06

## 2018-05-30 RX ADMIN — ALBUTEROL SULFATE 2.5 MG: 2.5 SOLUTION RESPIRATORY (INHALATION) at 20:01

## 2018-05-30 RX ADMIN — METOPROLOL TARTRATE 50 MG: 50 TABLET, FILM COATED ORAL at 21:02

## 2018-05-30 RX ADMIN — MULTIPLE VITAMINS W/ MINERALS TAB 1 TABLET: TAB at 08:25

## 2018-05-30 RX ADMIN — DOXYCYCLINE HYCLATE 100 MG: 100 TABLET, COATED ORAL at 08:25

## 2018-05-30 ASSESSMENT — ENCOUNTER SYMPTOMS
SORE THROAT: 0
SINUS PRESSURE: 0
APNEA: 0
VOMITING: 0
WHEEZING: 0
VOICE CHANGE: 0
RHINORRHEA: 0
CHOKING: 0
EYE REDNESS: 0
STRIDOR: 0
EYE PAIN: 0
NAUSEA: 0
COUGH: 1
SHORTNESS OF BREATH: 1
ABDOMINAL DISTENTION: 0
CHEST TIGHTNESS: 0
ANAL BLEEDING: 0
RECTAL PAIN: 0
COLOR CHANGE: 0
PHOTOPHOBIA: 0
EYE DISCHARGE: 0
TROUBLE SWALLOWING: 0
CONSTIPATION: 0

## 2018-05-30 ASSESSMENT — PAIN SCALES - GENERAL
PAINLEVEL_OUTOF10: 0
PAINLEVEL_OUTOF10: 0

## 2018-05-30 NOTE — CONSULTS
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 female admitted for weakness and fatigue and found to be hyponatremic with UTI. She has bronchiectasis with chronic cough in which a CT of chest was done showing possible PNA, pulmonary has been consulted for possible diagnostic bronchoscopy. Patient has been followed by Dr. Ilana Olivas since 2016 and most recently seen by Lane Dsouzama on 3/27/18 as ER follow up when treated with Zpack and Ceftriaxone. Asmanex was added with Acapella. She has known bilateral lung masses in which she has deferred aggressive work up. She has never had bronchoscopy nor needed cyclic ATB treatment. She has severe kyphoscoliosis and RLD. She is not on home oxygen. Clinically, she is at her baseline status with chronic cough productive of green/yellow sputum. This is unchanged as well as her chronic dyspnea on exertion. She is not hypoxic. She is afebrile. She has symptoms of UTI with mildly elevated WBC and PCT level.     Past Medical History         Diagnosis Date    Bronchiectasis ([address]     GERD (gastroesophageal reflux disease)     Hypertension     Peptic ulcer disease     Pneumonia     Scoliosis       Past Surgical History           Procedure Laterality Date    APPENDECTOMY      BACK SURGERY      lumbar    COLONOSCOPY      ENDOSCOPY, COLON, DIAGNOSTIC      EYE SURGERY      bilateral cataracts    GASTRECTOMY
Provider, MD   Wheat Dextrin (BENEFIBER PO) Take by mouth daily as needed    Yes Historical Provider, MD   aspirin 81 MG tablet Take 81 mg by mouth daily. Yes Historical Provider, MD   Probiotic Product (PROBIOTIC DAILY PO) Take  by mouth. Yes Historical Provider, MD   b complex vitamins capsule Take 1 capsule by mouth daily. Yes Historical Provider, MD   Misc. Devices (ACAPELLA) MISC 1 Device by Does not apply route 4 times daily 3/27/18   Herrera Cortes PA-C        Lab data:  CBC:    Recent Labs      05/29/18   1107   WBC  10.7   HGB  13.3   PLT  205     CMP:    Recent Labs      05/29/18   1107   NA  120*   K  5.8*   CL  85*   CO2  22*   BUN  21   CREATININE  1.2   GLUCOSE  124*   CALCIUM  8.7     Urine:No results for input(s): COLORU, PHUR, LABCAST, WBCUA, RBCUA, MUCUS, TRICHOMONAS, YEAST, BACTERIA, CLARITYU, SPECGRAV, LEUKOCYTESUR, UROBILINOGEN, Mertha Lusher in the last 72 hours. Invalid input(s): NITRATE, GLUCOSEUKETONESUAMORPHOUS   Sed Rate: No results for input(s): SEDRATE in the last 72 hours. Radiology       Review of Systems:  Source of data: patient and family    CONSTITUTIONAL  Fever: none, Chills: none, Weight loss: none, Malaise: yes, Fatigue: yes, Diaphoresis: none,   Weakness: none    SKIN  Rash: none, Itch: none, Open sores: none    HENT:  Headache: none, Hearing loss: none, Tinnitus: none, Ear pain: none, Ear discharge: none,   Nasal bleeding: none, Congestion: none, Stridor: none, Sore throat: none. EYES  Blurred vision: none, Double vision: none, Photophobia: none, Eye pain: none, Eye discharge: none,  Eye redness: none. CARDIOVASCULAR  Chest pain: none, Arm pain: none, Palpitations: none, Orthopnea: none, Claudication: none,   Leg swelling: none, PND: none.      RESPIRATORY  Cough: none, Hemoptysis: none, Sputum production: none, Shortness of breath: none, Wheezing: none    GASTROINTESTINAL  Heartburn: none, Nausea: none, Vomiting: none, Abdominal pain: none,

## 2018-05-30 NOTE — PLAN OF CARE
Problem: Nutrition  Goal: Optimal nutrition therapy  Outcome: Ongoing  Nutrition Problem: Inadequate oral intake  Intervention: Food and/or Nutrient Delivery: Continue current diet, ONS not indicated, Vitamin Supplement  Nutritional Goals: Pt will consume at least 75% of her meals to help maintain a good nutritional status

## 2018-05-30 NOTE — PROGRESS NOTES
Hospitalist Progress Note  [facility] ICU STEPDOWN TELEMETRY 4K       Patient: Ismael Husain [facility]  Unit/Bed: 4K-06/006-A  YOB: 1924  MRN: [id number]  Acct: [de-identified]   Admitting Diagnosis: Hyponatremia [E87.1]  Admit Date:  5/29/2018  Hospital Day: 1    Subjective:    Patient is feeling better today. H/o acute onset weakness. Has a chronic cough ,wt. loss and poor apetite     Patient Seen, Chart, Labs, Radiology studies, and Consults reviewed. Objective:   BP (!) 163/77   Pulse 73   Temp 97.6 °F (36.4 °C) (Oral)   Resp 16   Ht 5' 3\" (1.6 m)   Wt 86 lb 4.8 oz (39.1 kg)   SpO2 94%   BMI 15.29 kg/m²       Intake/Output Summary (Last 24 hours) at 05/30/18 1802  Last data filed at 05/30/18 1332   Gross per 24 hour   Intake          1667.87 ml   Output              100 ml   Net          1567.87 ml     Review of Systems   Constitutional: Positive for activity change, appetite change, fatigue and unexpected weight change. HENT: Negative for congestion, drooling, ear pain, hearing loss, nosebleeds, rhinorrhea, sinus pressure, sore throat, tinnitus, trouble swallowing and voice change. Eyes: Negative for photophobia, pain, discharge, redness and visual disturbance. Respiratory: Positive for cough and shortness of breath. Negative for apnea, choking, chest tightness, wheezing and stridor. Cardiovascular: Negative for chest pain, palpitations and leg swelling. Gastrointestinal: Negative for abdominal distention, anal bleeding, constipation, nausea, rectal pain and vomiting. Endocrine: Negative for cold intolerance, polydipsia, polyphagia and polyuria. Genitourinary: Negative for decreased urine volume, difficulty urinating, dysuria, flank pain, genital sores, menstrual problem, urgency, vaginal discharge and vaginal pain. Musculoskeletal: Negative for arthralgias, gait problem, myalgias, neck pain and neck stiffness. Skin: Negative for color change, pallor, rash and wound. Neurological: Positive for weakness. Negative for dizziness, syncope, speech difficulty, numbness and headaches. Hematological: Negative for adenopathy. Does not bruise/bleed easily. Psychiatric/Behavioral: Negative for agitation, confusion, dysphoric mood, self-injury, sleep disturbance and suicidal ideas. The patient is not hyperactive. Physical Exam   Constitutional: She is oriented to person, place, and time. She appears well-developed and well-nourished. No distress. Undernourished    HENT:   Head: Normocephalic and atraumatic. Right Ear: External ear normal.   Left Ear: External ear normal.   Nose: Nose normal.   Mouth/Throat: Oropharynx is clear and moist. No oropharyngeal exudate. Eyes: Conjunctivae and EOM are normal. Pupils are equal, round, and reactive to light. Right eye exhibits no discharge. Left eye exhibits no discharge. No scleral icterus. Neck: Normal range of motion. Neck supple. No JVD present. No tracheal deviation present. No thyromegaly present. Cardiovascular: Normal rate, regular rhythm, normal heart sounds and intact distal pulses. Exam reveals no gallop and no friction rub. No murmur heard. Pulmonary/Chest: Effort normal and breath sounds normal. No stridor. No respiratory distress. She has no wheezes. She has no rales. She exhibits no tenderness. Abdominal: Soft. Bowel sounds are normal. She exhibits no distension and no mass. There is no tenderness. There is no rebound and no guarding. Musculoskeletal: Normal range of motion. She exhibits deformity. She exhibits no edema or tenderness. scoliosis. Lymphadenopathy:     She has no cervical adenopathy. Neurological: She is alert and oriented to person, place, and time. She displays normal reflexes. No cranial nerve deficit. She exhibits normal muscle tone. Coordination normal.   Skin: Skin is warm and dry. No rash noted. She is not diaphoretic. No erythema. No pallor.    Psychiatric: She has a normal mood

## 2018-05-30 NOTE — PROGRESS NOTES
Toilet raiser (Pt does not use shower chair d/t it being slippery. Pt's daughter states shower is big enough to fit shower chair inside)  Bathroom Accessibility: Accessible    Receives Help From: Family  ADL Assistance: [address]: Independent  Homemaking Responsibilities: Yes (Pt has house keeper every 2 weeks to clean house; family brings in food)  Ambulation Assistance: Independent  Transfer Assistance: Independent    Active : No  Mode of Transportation: Car  Occupation: Retired  Type of occupation:        Objective:     RLE AROM: WFL     LLE AROM : WFL       Strength RLE: WFL  Comment: grossly 4-/5    Strength LLE: WFL  Comment: grossly 4-/5       Sensation  Overall Sensation Status: WFL       Supine to Sit: Stand by assistance (with use of bedrail)  Sit to Supine: Stand by assistance    Transfers  Sit to Stand: Contact guard assistance  Stand to sit: Contact guard assistance (with quick descent)       Ambulation 1  Surface: level tile  Device: Rolling Walker  Assistance: Contact guard assistance  Quality of Gait: slower pace, kyphotic posture, limited heelstrike, mild unsteadiness especially with 1 sharper turn attempted  Distance: 80 ft        Exercises:    Pt was guided through completion of LE exercises including: heelslides, hip abd/add, marches, long arc quads, ankle pumps, and quad sets. Each x 10-12 reps for strengthening to improve functional mobility. Activity Tolerance:  Activity Tolerance: Patient limited by fatigue;Patient limited by endurance    Treatment Initiated: See exercises above     Assessment:   Body structures, Functions, Activity limitations: Decreased functional mobility , Decreased balance, Decreased endurance, Decreased strength  Assessment: Pt is a 80 y.o. female that has kept herself moving generally well and reports being strong and independent with mobility until just recently when hearing to the sudden passing of a family mbr that \"shook her\" and she noted that she lost some strength from not eating and stopped doing as much as usual. Pt is not motivated to get moving again and regain her strength. Pt would benefit from continued skilled PT to address strengthening, transfers and gait training for improved functional mobility. Prognosis: Good    Clinical Presentation: Low - Stable and Uncomplicated:      Decision Making: Moderate Complexitybased on patient assessment and decision making process of determining plan of care and establishing reasonable expectations for measurable functional outcomes    REQUIRES PT FOLLOW UP: Yes  Discharge Recommendations: Continue to assess pending progress    Patient Education:  Patient Education: Plan of care and LE exercises    Equipment Recommendations:  Equipment Needed: No    Safety:  Type of devices: All fall risk precautions in place, Gait belt, Call light within reach, Patient at risk for falls, Left in bed, Nurse notified    Plan:  Times per week: 3-5x GM  Times per day: Daily  Specific instructions for Next Treatment: ther ex, ther act, gait trng, balance  Current Treatment Recommendations: Strengthening, Functional Mobility Training, Balance Training, Gait Training, Endurance Training, Safety Education & Training, Patient/Caregiver Education & Training    Goals:  Patient goals : go home  Short term goals  Time Frame for Short term goals: 4 days  Short term goal 1: Pt to be Supervision for supine <> sit to get in/out of bed  Short term goal 2: Pt to be Supervision for sit <> stand to get up to ambulate  Short term goal 3: Pt to ambulate > 100 ft with RW with Supervision for household distances  Long term goals  Time Frame for Long term goals : not set due to short ELOS    Evaluation Complexity: Based on the findings of patient history, examination, clinical presentation, and decision making during this evaluation, the evaluation of Allan Laser is of low complexity.     PT G-Codes  Functional

## 2018-05-30 NOTE — CARE COORDINATION
5/30/18, 12:04 PM    DISCHARGE BARRIERS      SW spoke to patient, her daughter and daughter-in-law. We discussed her options for rehab--TCU vs ECF. She prefers TCU, states she has been there before, and her family is in agreement. Left message for Marilyn with admissions.   And updated the CM

## 2018-05-30 NOTE — PROGRESS NOTES
[address] ICU STEPDOWN TELEMETRY 4K  EVALUATION    Time:  Time In: 0780  Time Out: 1154  Timed Code Treatment Minutes: 23 Minutes  Minutes: 33    Date: 2018  Patient Name: Winsome Rodríguez,   Gender: female      MRN: [id number]  : 1924  (80 y.o.)  Referring Practitioner: Norman Guy MD  Diagnosis: Hyponatremia  Additional Pertinent Hx: Per ED Note: Ariana Du is a 80 y.o. female who presents to the Emergency Department for the evaluation of shortness of breath onset 4 days. The patient has been coughing up green sputum for 4 days also. Patient has not been taking her medications due to being fatigued for 4 days. He denies fever, chest pain, nausea, vomiting, or diarrhea. The patient used his inhaler at home, with no relief. He denies using nebulizer at home. He has a history of COPD, but denies ever smoking. Restrictions/Precautions:  General Precautions, Fall Risk      Past Medical History:   Diagnosis Date    Bronchiectasis ([address]     GERD (gastroesophageal reflux disease)     Hypertension     Peptic ulcer disease     Pneumonia     Scoliosis      Past Surgical History:   Procedure Laterality Date    APPENDECTOMY      BACK SURGERY      lumbar    COLONOSCOPY      ENDOSCOPY, COLON, DIAGNOSTIC      EYE SURGERY      bilateral cataracts    GASTRECTOMY      partial    HYSTERECTOMY             Subjective  Chart Reviewed: Yes  Patient assessed for rehabilitation services?: Yes    Subjective: Upon arrival patient was sitting up in recliner with family present. Pt is pleasant and cooperative throughout session.    Comments: RN okayed session    General:  Overall Orientation Status: Impaired  Orientation Level: Oriented to place, Disoriented to time, Oriented to person, Oriented to situation    Vision: Impaired  Vision Exceptions: Wears glasses at all times    Hearing: Within functional limits    Pain:  Pain Assessment  Patient Currently estimated length of stay. Evaluation Complexity: Based on the findings of patient history, examination, clinical presentation, and decision making during this evaluation, this patient is of medium complexity. OT G-codes  Functional Limitation: Self care  Self Care Current Status (): At least 40 percent but less than 60 percent impaired, limited or restricted  Self Care Goal Status ():  At least 20 percent but less than 40 percent impaired, limited or restricted  AM-PAC Inpatient Daily Activity Raw Score: 18  AM-PAC Inpatient ADL T-Scale Score : 38.66  ADL Inpatient CMS 0-100% Score: 46.65  ADL Inpatient CMS G-Code Modifier : CK

## 2018-05-30 NOTE — CARE COORDINATION
DISCHARGE BARRIERS  5/30/18, 10:17 AM    Reason for Referral: \"Please assess for discharge needs\"  Mental Status: Alert and oriented  Decision Making: Makes own decisions with family support  Family/Social/Home Environment: Assessment completed with patient, daughter, and daughter-in-law- Patient lives in a one story house alone. Patient states she has no steps in the home or to enter. Patient states family has been staying with her 24 hours due to increased weakness the last five days. Daughter-in-law states patient has been mostly bed-bound these past five days due to the weakness. Patient states she is feeling much better but understands she is still weak. SW asked patient if family would be able to stay with her if discharged home and still weak- Patient states she does not want to \"burden\" family any longer. Patient states family has been bringing meals and doing laundry. Patient states she is usually able to ambulate with a walker with wheels and a seat. Patient has a shower chair in the the shower but does not use it as she is afraid she will slide out of it. Current Services: None  Current Equipment: walker with wheels and a seat, grab bars in shower with a shower seat (does not use seat), raised toilet seat  Payment Source: [employer]  Concerns or Barriers to Discharge: Possible need for PT/OT at rehab facility versus Home with [facility]  Collabrative List of ECF/HH were provided: Offered, provided patient and family with [facility] list and ECF list per request    Teach Back Method used with patient and family regarding care plan and discharge planning  Patient and family verbalize understanding of the plan of care and contribute to goal setting. Anticipated Needs/Discharge Plan: Patient from home alone. Patient states she wants to return home at discharge but is willing to do what she needs to. Patient is open to [facility] for rehab versus home with [facility].   Daughter states two sons should be arriving tomorrow to discuss plan. Patient would like to work with PT/OT to assess if she feels safe to return home. Will follow for discharge plan.     Electronically signed by YARIEL Yin on 5/30/2018 at 10:17 AM

## 2018-05-30 NOTE — PLAN OF CARE
Problem: Discharge Planning:  Goal: Discharged to appropriate level of care  Discharged to appropriate level of care   Outcome: Ongoing  Patient from home alone. Possible home with [place] versus ECF for rehab. See SW note for details.

## 2018-05-31 LAB
ANION GAP SERPL CALCULATED.3IONS-SCNC: 10 MEQ/L (ref 8–16)
BUN BLDV-MCNC: 30 MG/DL (ref 7–22)
CALCIUM SERPL-MCNC: 7.7 MG/DL (ref 8.5–10.5)
CHLORIDE BLD-SCNC: 89 MEQ/L (ref 98–111)
CO2: 27 MEQ/L (ref 23–33)
CREAT SERPL-MCNC: 0.8 MG/DL (ref 0.4–1.2)
GFR SERPL CREATININE-BSD FRML MDRD: 67 ML/MIN/1.73M2
GLUCOSE BLD-MCNC: 107 MG/DL (ref 70–108)
POTASSIUM SERPL-SCNC: 3.8 MEQ/L (ref 3.5–5.2)
SODIUM BLD-SCNC: 126 MEQ/L (ref 135–145)
URINE CULTURE REFLEX: NORMAL
VITAMIN B-12: 458 PG/ML (ref 211–911)

## 2018-05-31 PROCEDURE — 99233 SBSQ HOSP IP/OBS HIGH 50: CPT | Performed by: INTERNAL MEDICINE

## 2018-05-31 PROCEDURE — 94640 AIRWAY INHALATION TREATMENT: CPT

## 2018-05-31 PROCEDURE — 83880 ASSAY OF NATRIURETIC PEPTIDE: CPT

## 2018-05-31 PROCEDURE — 97116 GAIT TRAINING THERAPY: CPT

## 2018-05-31 PROCEDURE — 99999 PR OFFICE/OUTPT VISIT,PROCEDURE ONLY: CPT | Performed by: INTERNAL MEDICINE

## 2018-05-31 PROCEDURE — 80048 BASIC METABOLIC PNL TOTAL CA: CPT

## 2018-05-31 PROCEDURE — 36415 COLL VENOUS BLD VENIPUNCTURE: CPT

## 2018-05-31 PROCEDURE — 84484 ASSAY OF TROPONIN QUANT: CPT

## 2018-05-31 PROCEDURE — 99232 SBSQ HOSP IP/OBS MODERATE 35: CPT | Performed by: INTERNAL MEDICINE

## 2018-05-31 PROCEDURE — APPSS30 APP SPLIT SHARED TIME 16-30 MINUTES: Performed by: NURSE PRACTITIONER

## 2018-05-31 PROCEDURE — 6370000000 HC RX 637 (ALT 250 FOR IP): Performed by: FAMILY MEDICINE

## 2018-05-31 PROCEDURE — 94669 MECHANICAL CHEST WALL OSCILL: CPT

## 2018-05-31 PROCEDURE — 97530 THERAPEUTIC ACTIVITIES: CPT

## 2018-05-31 PROCEDURE — 97535 SELF CARE MNGMENT TRAINING: CPT

## 2018-05-31 PROCEDURE — 6370000000 HC RX 637 (ALT 250 FOR IP): Performed by: INTERNAL MEDICINE

## 2018-05-31 PROCEDURE — 6360000002 HC RX W HCPCS: Performed by: INTERNAL MEDICINE

## 2018-05-31 PROCEDURE — 83735 ASSAY OF MAGNESIUM: CPT

## 2018-05-31 PROCEDURE — 84295 ASSAY OF SERUM SODIUM: CPT

## 2018-05-31 PROCEDURE — 2580000003 HC RX 258: Performed by: INTERNAL MEDICINE

## 2018-05-31 PROCEDURE — 93005 ELECTROCARDIOGRAM TRACING: CPT | Performed by: FAMILY MEDICINE

## 2018-05-31 PROCEDURE — 82330 ASSAY OF CALCIUM: CPT

## 2018-05-31 PROCEDURE — 1200000003 HC TELEMETRY R&B

## 2018-05-31 PROCEDURE — 82607 VITAMIN B-12: CPT

## 2018-05-31 RX ORDER — ACETAMINOPHEN 325 MG/1
650 TABLET ORAL EVERY 4 HOURS PRN
Status: DISCONTINUED | OUTPATIENT
Start: 2018-05-31 | End: 2018-06-01 | Stop reason: HOSPADM

## 2018-05-31 RX ADMIN — Medication 1 CAPSULE: at 09:49

## 2018-05-31 RX ADMIN — MULTIPLE VITAMINS W/ MINERALS TAB 1 TABLET: TAB at 09:49

## 2018-05-31 RX ADMIN — ALBUTEROL SULFATE 2.5 MG: 2.5 SOLUTION RESPIRATORY (INHALATION) at 16:46

## 2018-05-31 RX ADMIN — Medication 10 ML: at 09:52

## 2018-05-31 RX ADMIN — ACETAMINOPHEN 650 MG: 325 TABLET ORAL at 21:16

## 2018-05-31 RX ADMIN — ALBUTEROL SULFATE 2.5 MG: 2.5 SOLUTION RESPIRATORY (INHALATION) at 08:08

## 2018-05-31 RX ADMIN — METOPROLOL TARTRATE 50 MG: 50 TABLET, FILM COATED ORAL at 20:07

## 2018-05-31 RX ADMIN — DOXYCYCLINE HYCLATE 100 MG: 100 TABLET, COATED ORAL at 09:50

## 2018-05-31 RX ADMIN — ASPIRIN 81 MG 81 MG: 81 TABLET ORAL at 09:51

## 2018-05-31 RX ADMIN — Medication 4 PUFF: at 21:46

## 2018-05-31 RX ADMIN — ACETAMINOPHEN 650 MG: 325 TABLET ORAL at 00:17

## 2018-05-31 RX ADMIN — DOXYCYCLINE HYCLATE 100 MG: 100 TABLET, COATED ORAL at 20:07

## 2018-05-31 RX ADMIN — ALBUTEROL SULFATE 2.5 MG: 2.5 SOLUTION RESPIRATORY (INHALATION) at 12:47

## 2018-05-31 RX ADMIN — ACETAMINOPHEN 650 MG: 325 TABLET ORAL at 05:56

## 2018-05-31 RX ADMIN — GUAIFENESIN 1200 MG: 600 TABLET, EXTENDED RELEASE ORAL at 09:50

## 2018-05-31 RX ADMIN — ALBUTEROL SULFATE 2.5 MG: 2.5 SOLUTION RESPIRATORY (INHALATION) at 21:46

## 2018-05-31 RX ADMIN — PANTOPRAZOLE SODIUM 40 MG: 40 TABLET, DELAYED RELEASE ORAL at 05:56

## 2018-05-31 RX ADMIN — Medication 4 PUFF: at 08:18

## 2018-05-31 RX ADMIN — FLUDROCORTISONE ACETATE 0.1 MG: 0.1 TABLET ORAL at 09:49

## 2018-05-31 RX ADMIN — METOPROLOL TARTRATE 50 MG: 50 TABLET, FILM COATED ORAL at 09:49

## 2018-05-31 RX ADMIN — ENOXAPARIN SODIUM 30 MG: 30 INJECTION SUBCUTANEOUS at 16:00

## 2018-05-31 RX ADMIN — Medication 10 ML: at 20:07

## 2018-05-31 ASSESSMENT — ENCOUNTER SYMPTOMS
NAUSEA: 0
RECTAL PAIN: 0
RHINORRHEA: 0
TROUBLE SWALLOWING: 0
EYE REDNESS: 0
STRIDOR: 0
EYE DISCHARGE: 0
CONSTIPATION: 0
APNEA: 0
CHEST TIGHTNESS: 0
ABDOMINAL DISTENTION: 0
EYE PAIN: 0
SHORTNESS OF BREATH: 0
COLOR CHANGE: 0
VOICE CHANGE: 0
VOMITING: 0
CHOKING: 0
PHOTOPHOBIA: 0
COUGH: 1
WHEEZING: 0
SORE THROAT: 0
SINUS PRESSURE: 0
ANAL BLEEDING: 0

## 2018-05-31 ASSESSMENT — PAIN DESCRIPTION - FREQUENCY
FREQUENCY: INTERMITTENT
FREQUENCY: INTERMITTENT

## 2018-05-31 ASSESSMENT — PAIN DESCRIPTION - DESCRIPTORS
DESCRIPTORS: HEADACHE
DESCRIPTORS: HEADACHE

## 2018-05-31 ASSESSMENT — PAIN SCALES - GENERAL
PAINLEVEL_OUTOF10: 3
PAINLEVEL_OUTOF10: 7
PAINLEVEL_OUTOF10: 2
PAINLEVEL_OUTOF10: 0
PAINLEVEL_OUTOF10: 7

## 2018-05-31 ASSESSMENT — PAIN DESCRIPTION - ONSET
ONSET: AWAKENED FROM SLEEP
ONSET: AWAKENED FROM SLEEP

## 2018-05-31 ASSESSMENT — PAIN DESCRIPTION - LOCATION
LOCATION: HEAD
LOCATION: HEAD

## 2018-05-31 ASSESSMENT — PAIN DESCRIPTION - PROGRESSION
CLINICAL_PROGRESSION: GRADUALLY IMPROVING
CLINICAL_PROGRESSION: GRADUALLY IMPROVING

## 2018-05-31 ASSESSMENT — PAIN DESCRIPTION - PAIN TYPE
TYPE: ACUTE PAIN
TYPE: ACUTE PAIN

## 2018-05-31 NOTE — PLAN OF CARE
Problem: Airway Clearance - Ineffective:   Intervention: Airway assessment  Vest percussion completed, patient able to cough up secretions during tx

## 2018-05-31 NOTE — PLAN OF CARE
Problem: Falls - Risk of:  Goal: Will remain free from falls  Will remain free from falls   Outcome: Ongoing  Absent of falls this shift. Oriented to surroundings. Up with help with use of walker and gait belt. Problem: Risk for Impaired Skin Integrity  Goal: Tissue integrity - skin and mucous membranes  Structural intactness and normal physiological function of skin and  mucous membranes. Outcome: Ongoing  No new skin breakdown. Turned q 2 hours and up in chair today    Problem: Discharge Planning:  Goal: Discharged to appropriate level of care  Discharged to appropriate level of care    Outcome: Ongoing  Lived at home alone prior to admission. Has a TCU consult    Problem: Gas Exchange - Impaired:  Goal: Levels of oxygenation will improve  Levels of oxygenation will improve   Outcome: Completed Date Met: 05/31/18      Problem: Pain:  Goal: Pain level will decrease  Pain level will decrease   Outcome: Met This Shift      Problem: Nutrition  Goal: Optimal nutrition therapy  Outcome: Ongoing  Appetite fair. Diet encouraged. Problem: Airway Clearance - Ineffective:  Goal: Clear lung sounds  Clear lung sounds   Outcome: Met This Shift      Comments: Care plan reviewed with patient and daughter. Patient and daughter verbalize understanding of the plan of care and contribute to goal setting.

## 2018-05-31 NOTE — PROGRESS NOTES
Normocephalic and atraumatic. Neck: Neck supple. No JVD or tracheal deviation present. Cardiovascular: Regular rate, regular rhythm, S1 and S2 with no murmur. No peripheral edema  Pulmonary/Chest: Normal effort with diminished breath sounds. Scattered rhonchi, no wheezing. No stridor. No respiratory distress. Abdominal: Soft. Bowel sounds audible. No distension or tenderness to palpation. Musculoskeletal: Moves all extremities. Kyphoscoliotic. Neurological: Patient is alert and oriented to person, place, and time. Skin: Skin is warm and dry. Labs   ABG  Lab Results   Component Value Date    PH 7.40 05/30/2018    PO2 67 05/30/2018    PCO2 41 05/30/2018    HCO3 25 05/30/2018    O2SAT 93 05/30/2018     No results found for: IFIO2, MODE, SETTIDVOL, SETPEEP  CBC  Recent Labs      05/29/18   1107   WBC  10.7   RBC  4.52   HGB  13.3   HCT  39.1   MCV  86.6   MCH  29.4   MCHC  33.9   RDW  14.6*   PLT  205   MPV  7.9      BMP  Recent Labs      05/29/18   1107  05/30/18   0442  05/31/18   0439   NA  120*  124*  126*   K  5.8*  4.6  3.8   CL  85*  88*  89*   CO2  22*  21*  27   BUN  21  30*  30*   CREATININE  1.2  1.4*  0.8   GLUCOSE  124*  136*  107   MG   --   1.9   --    CALCIUM  8.7  8.1*  7.7*     LFT  Recent Labs      05/29/18   1107   AST  23   ALT  11   BILITOT  0.4   ALKPHOS  110     TROP  Lab Results   Component Value Date    TROPONINT < 0.010 05/29/2018    TROPONINT < 0.010 07/17/2017    TROPONINT < 0.010 07/16/2017     BNP  Lab Results   Component Value Date    PROBNP 1417.0 05/29/2018    PROBNP 1046.0 07/06/2017    PROBNP 1128.0 03/01/2017     D-Dimer  No results found for: DDIMER  Lactic Acid  Recent Labs      05/29/18   1107   LACTA  1.1     INR  No results for input(s): INR, PROTIME in the last 72 hours. PTT  No results for input(s): APTT in the last 72 hours. Glucose  No results for input(s): POCGLU in the last 72 hours.      Recent Labs      05/29/18   2300   PHUR  5.0   COLORU  YELLOW

## 2018-05-31 NOTE — PROGRESS NOTES
Leg swelling: none, PND: none. RESPIRATORY  Cough: none, Hemoptysis: none, Sputum production: none, Shortness of breath: none, Wheezing: none    GASTROINTESTINAL  Heartburn: none, Nausea: none, Vomiting: none, Abdominal pain: none, Diarrhea: none,   Constipation: none, Blood in the stool: none, Melena: none, Rebound: none, Rovsing's: none. GENITOURINARY  Dysuria: none, Pyuria: none, Gross hematuria: none, Urgency: none, Flank pain: none, STD: none. MUSCULOSKELETAL  Myalgia: none, Neck pain: none, Thoracic pain: none, Lumbar pain: none, Joint pain: none, Falls: none    ENDOCRINE/HEMATOLOGY/ALLERGIC  Easy bruising: none, Easy bleeding: none, Environmental allergies: none, Polydipsia: none. NEUROLOGICAL  Dizziness: none, Tingling: none, Numbness: none, Tremor: none, Sensory change: none,   Speech change: none, Focal weakness: none, Seizures: none, Loss of consciousness: none,    PSYCHIATRIC  Depression: none, Suicidal ideation: none, Heroin abuse: none, Cocaine abuse: none,   Marijuana abuse: none, Hallucinations: none, Anxiety: none, Memory loss: none       Physical Examination:  BP (!) 160/79   Pulse 56   Temp 97.5 °F (36.4 °C) (Oral)   Resp 16   Ht 5' 3\" (1.6 m)   Wt 87 lb 6.4 oz (39.6 kg)   SpO2 93%   BMI 15.48 kg/m²       General appearance: alert and appropriate. HEENT: Head: Normocephalic, no lesions, without obvious abnormality. Neck: no adenopathy, no carotid bruit, no JVD, supple, symmetrical, trachea midline and thyroid not enlarged, symmetric, no tenderness/mass/nodules  Lungs: clear to auscultation bilaterally  Heart: regular rate and rhythm, S1, S2 normal, no murmur, click, rub or gallop  Abdomen: soft, non-tender; bowel sounds normal; no masses,  no organomegaly  Extremities: extremities normal, atraumatic, no cyanosis or edema  Neurologic: Mental status: Alert, oriented, thought content appropriate  PSY: No evidence of depression. Mood is normal for the patient.   Skin: Warm and dry. No unusual lesions or rashes noted. Muscles: Hand  is equal and strong bilaterally. Leg strength is equal and strong. Skeletal: No bony or skeletal abnormalities noted. Admission weight: 88 lb (39.9 kg)  Wt Readings from Last 3 Encounters:   05/31/18 87 lb 6.4 oz (39.6 kg)   03/27/18 92 lb (41.7 kg)   10/16/17 88 lb (39.9 kg)     Body mass index is 15.48 kg/m². Clinical Impressions:    1. 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 Chronic hyponatremia-perhaps from mass. 3. Pneumonia  4. Thoracic kyphosis     Plan of Care    1. Urine sodium is higher than expected suggesting chronic sodium loss by the kidneys. I am continuing florinef to reduce the urine sodium loss. DO NOT PRESCRIBE SODIUM CHLORIDE TABLETS. THAT WOULD ONLY MAKE THINGS WORSE. 2. Will recheck urine sodium level in the morning. It should be lower with the florinef. Sarmad Bond DO  [facility].

## 2018-05-31 NOTE — PROGRESS NOTES
2.4 - 5.7 mg/dL 5.0   Osmolality Latest Ref Range: 275 - 295 mosmol/kg 272 (L)   Pro-BNP Latest Ref Range: 0.0 - 1800.0 pg/mL 1417.0     MICROBIOLOGY & HISTOPATHOLOGY. Results for Jordana Fitting (MRN [id number]) as of 5/30/2018 12:55   Ref. Range 5/29/2018 23:00   Color, UA Latest Ref Range: STRAW-YELL  YELLOW   Glucose, UA Latest Ref Range: NEGATIVE mg/dl 100 (A)   Bilirubin, Urine Latest Ref Range: NEGATIVE  NEGATIVE   Ketones, Urine Latest Ref Range: NEGATIVE  TRACE (A)   Blood, Urine Latest Ref Range: NEGATIVE  TRACE (A)   pH, UA Latest Ref Range: 5.0 - 9.0  5.0   Protein, UA Latest Ref Range: NEGATIVE  NEGATIVE   Urobilinogen, Urine Latest Ref Range: 0.0 - 1.0 eu/dl 1.0   Nitrite, Urine Latest Ref Range: NEGATIVE  NEGATIVE   Leukocyte Esterase, Urine Latest Ref Range: NEGATIVE  SMALL (A)   Casts UA Latest Ref Range: NONE SEEN /lpf 4-8 HYALINE   CASTS 2 Latest Ref Range: NONE SEEN /lpf NONE SEEN   Mucus, UA Latest Ref Range: NONE SEEN/  NONE SEEN   WBC, UA Latest Ref Range: 0-4/hpf /hpf 2-4   RBC, UA Latest Ref Range: 0-2/hpf /hpf 3-5   Epi Cells Latest Ref Range: 3-5/hpf /hpf 0-2   Renal Epithelial, Urine Latest Ref Range: NONE SEEN  NONE SEEN   Bacteria, UA Latest Ref Range: FEW/NONE S /hpf MODERATE   Amorphous, UA Latest Ref Range: NONE SEEN  URATES   Yeast, Urine Latest Ref Range: NONE SEEN  NONE SEEN   Crystals Latest Ref Range: NONE SEEN  URIC ACID   Character, Urine Latest Ref Range: CLEAR-SL C  CLOUDY (A)   Urine Culture Reflex Unknown No growth-prelimi. ..    Myoglobin, Ur Latest Ref Range: NEGATIVE  POSITIVE (A)   Creatinine, Urine Latest Units: mg/dl 112.9   Osmolality, Ur Latest Ref Range: 250 - 750 mosmol/kg 382   Potassium, Urine Latest Units: meq/l 40.8   Sodium, Ur Latest Units: meq/l 41   Specific Gravity, Urine Latest Ref Range: 1.002 - 1.03  1.018   Specimen Unknown URINE   MISCELLANEOUS 2 Unknown NONE SEEN   Eosinophil Smear Latest Ref Range: NONE SEEN  NONE SEEN     Results for Dana Kramer

## 2018-06-01 ENCOUNTER — HOSPITAL ENCOUNTER (INPATIENT)
Age: 83
LOS: 10 days | Discharge: HOME HEALTH | DRG: 947 | End: 2018-06-11
Attending: FAMILY MEDICINE | Admitting: FAMILY MEDICINE
Payer: MEDICARE

## 2018-06-01 VITALS
DIASTOLIC BLOOD PRESSURE: 77 MMHG | WEIGHT: 85.13 LBS | BODY MASS INDEX: 15.08 KG/M2 | RESPIRATION RATE: 16 BRPM | HEART RATE: 70 BPM | OXYGEN SATURATION: 95 % | TEMPERATURE: 98.3 F | HEIGHT: 63 IN | SYSTOLIC BLOOD PRESSURE: 145 MMHG

## 2018-06-01 DIAGNOSIS — R53.1 WEAKNESS GENERALIZED: ICD-10-CM

## 2018-06-01 DIAGNOSIS — J98.4 RESTRICTIVE LUNG DISEASE: ICD-10-CM

## 2018-06-01 DIAGNOSIS — N17.9 AKI (ACUTE KIDNEY INJURY) (HCC): ICD-10-CM

## 2018-06-01 DIAGNOSIS — E87.1 HYPONATREMIA: ICD-10-CM

## 2018-06-01 DIAGNOSIS — J41.1 MUCOPURULENT CHRONIC BRONCHITIS (HCC): ICD-10-CM

## 2018-06-01 DIAGNOSIS — J47.1 BRONCHIECTASIS WITH ACUTE EXACERBATION (HCC): Primary | ICD-10-CM

## 2018-06-01 DIAGNOSIS — E87.1 CHRONIC HYPONATREMIA: ICD-10-CM

## 2018-06-01 DIAGNOSIS — E22.2 SIADH (SYNDROME OF INAPPROPRIATE ADH PRODUCTION) (HCC): ICD-10-CM

## 2018-06-01 PROBLEM — E43 SEVERE MALNUTRITION (HCC): Status: ACTIVE | Noted: 2018-06-01

## 2018-06-01 PROBLEM — J44.1 COPD EXACERBATION (HCC): Status: ACTIVE | Noted: 2018-06-01

## 2018-06-01 LAB
ALBUMIN SERPL-MCNC: 3 G/DL (ref 3.5–5.1)
ALP BLD-CCNC: 87 U/L (ref 38–126)
ALT SERPL-CCNC: 9 U/L (ref 11–66)
ANION GAP SERPL CALCULATED.3IONS-SCNC: 10 MEQ/L (ref 8–16)
AST SERPL-CCNC: 18 U/L (ref 5–40)
BILIRUB SERPL-MCNC: 0.2 MG/DL (ref 0.3–1.2)
BUN BLDV-MCNC: 25 MG/DL (ref 7–22)
CALCIUM IONIZED: 1.02 MMOL/L (ref 1.12–1.32)
CALCIUM SERPL-MCNC: 8.4 MG/DL (ref 8.5–10.5)
CHLORIDE BLD-SCNC: 87 MEQ/L (ref 98–111)
CO2: 29 MEQ/L (ref 23–33)
CREAT SERPL-MCNC: 0.8 MG/DL (ref 0.4–1.2)
CREATININE URINE: 90.9 MG/DL
GFR SERPL CREATININE-BSD FRML MDRD: 67 ML/MIN/1.73M2
GLUCOSE BLD-MCNC: 118 MG/DL (ref 70–108)
MAGNESIUM: 1.7 MG/DL (ref 1.6–2.4)
MYOGLOBIN URINE: < 1 MG/L (ref 0–1)
OSMOLALITY URINE: 503 MOSMOL/KG (ref 250–750)
PHOSPHORUS: 3.3 MG/DL (ref 2.4–4.7)
POTASSIUM SERPL-SCNC: 4 MEQ/L (ref 3.5–5.2)
PRO-BNP: 1325 PG/ML (ref 0–1800)
SODIUM BLD-SCNC: 122 MEQ/L (ref 135–145)
SODIUM BLD-SCNC: 126 MEQ/L (ref 135–145)
SODIUM BLD-SCNC: 127 MEQ/L (ref 135–145)
SODIUM URINE: 31 MEQ/L
TOTAL PROTEIN: 6 G/DL (ref 6.1–8)
TROPONIN T: < 0.01 NG/ML

## 2018-06-01 PROCEDURE — 6360000002 HC RX W HCPCS: Performed by: INTERNAL MEDICINE

## 2018-06-01 PROCEDURE — 97110 THERAPEUTIC EXERCISES: CPT

## 2018-06-01 PROCEDURE — 6370000000 HC RX 637 (ALT 250 FOR IP): Performed by: INTERNAL MEDICINE

## 2018-06-01 PROCEDURE — 1290000000 HC SEMI PRIVATE OTHER R&B

## 2018-06-01 PROCEDURE — 36415 COLL VENOUS BLD VENIPUNCTURE: CPT

## 2018-06-01 PROCEDURE — 99232 SBSQ HOSP IP/OBS MODERATE 35: CPT | Performed by: INTERNAL MEDICINE

## 2018-06-01 PROCEDURE — 84295 ASSAY OF SERUM SODIUM: CPT

## 2018-06-01 PROCEDURE — 6370000000 HC RX 637 (ALT 250 FOR IP): Performed by: FAMILY MEDICINE

## 2018-06-01 PROCEDURE — 84484 ASSAY OF TROPONIN QUANT: CPT

## 2018-06-01 PROCEDURE — 99239 HOSP IP/OBS DSCHRG MGMT >30: CPT | Performed by: INTERNAL MEDICINE

## 2018-06-01 PROCEDURE — 82570 ASSAY OF URINE CREATININE: CPT

## 2018-06-01 PROCEDURE — 80053 COMPREHEN METABOLIC PANEL: CPT

## 2018-06-01 PROCEDURE — 97116 GAIT TRAINING THERAPY: CPT

## 2018-06-01 PROCEDURE — APPSS30 APP SPLIT SHARED TIME 16-30 MINUTES: Performed by: NURSE PRACTITIONER

## 2018-06-01 PROCEDURE — 99233 SBSQ HOSP IP/OBS HIGH 50: CPT | Performed by: INTERNAL MEDICINE

## 2018-06-01 PROCEDURE — 2580000003 HC RX 258: Performed by: INTERNAL MEDICINE

## 2018-06-01 PROCEDURE — 84300 ASSAY OF URINE SODIUM: CPT

## 2018-06-01 PROCEDURE — 3609027000 HC BRONCHOSCOPY: Performed by: INTERNAL MEDICINE

## 2018-06-01 PROCEDURE — 84100 ASSAY OF PHOSPHORUS: CPT

## 2018-06-01 PROCEDURE — 0220000000 HC SKILLED NURSING FACILITY

## 2018-06-01 PROCEDURE — 94640 AIRWAY INHALATION TREATMENT: CPT

## 2018-06-01 PROCEDURE — 83935 ASSAY OF URINE OSMOLALITY: CPT

## 2018-06-01 PROCEDURE — 94669 MECHANICAL CHEST WALL OSCILL: CPT

## 2018-06-01 RX ORDER — M-VIT,TX,IRON,MINS/CALC/FOLIC 27MG-0.4MG
1 TABLET ORAL DAILY
Status: CANCELLED | OUTPATIENT
Start: 2018-06-02

## 2018-06-01 RX ORDER — ACETAMINOPHEN 325 MG/1
650 TABLET ORAL EVERY 4 HOURS PRN
Status: CANCELLED | OUTPATIENT
Start: 2018-06-01

## 2018-06-01 RX ORDER — PANTOPRAZOLE SODIUM 40 MG/1
40 TABLET, DELAYED RELEASE ORAL
Status: DISCONTINUED | OUTPATIENT
Start: 2018-06-02 | End: 2018-06-11 | Stop reason: HOSPADM

## 2018-06-01 RX ORDER — M-VIT,TX,IRON,MINS/CALC/FOLIC 27MG-0.4MG
1 TABLET ORAL DAILY
Status: DISCONTINUED | OUTPATIENT
Start: 2018-06-02 | End: 2018-06-11 | Stop reason: HOSPADM

## 2018-06-01 RX ORDER — ACETAMINOPHEN 325 MG/1
650 TABLET ORAL EVERY 4 HOURS PRN
Status: DISCONTINUED | OUTPATIENT
Start: 2018-06-01 | End: 2018-06-11 | Stop reason: HOSPADM

## 2018-06-01 RX ORDER — METOPROLOL TARTRATE 50 MG/1
50 TABLET, FILM COATED ORAL 2 TIMES DAILY
Status: DISCONTINUED | OUTPATIENT
Start: 2018-06-01 | End: 2018-06-11 | Stop reason: HOSPADM

## 2018-06-01 RX ORDER — ASPIRIN 81 MG/1
81 TABLET, CHEWABLE ORAL DAILY
Status: CANCELLED | OUTPATIENT
Start: 2018-06-02

## 2018-06-01 RX ORDER — ASPIRIN 81 MG/1
81 TABLET, CHEWABLE ORAL DAILY
Status: DISCONTINUED | OUTPATIENT
Start: 2018-06-02 | End: 2018-06-11 | Stop reason: HOSPADM

## 2018-06-01 RX ORDER — METOPROLOL TARTRATE 50 MG/1
50 TABLET, FILM COATED ORAL 2 TIMES DAILY
Status: CANCELLED | OUTPATIENT
Start: 2018-06-01

## 2018-06-01 RX ORDER — GUAIFENESIN 600 MG/1
1200 TABLET, EXTENDED RELEASE ORAL DAILY
Status: CANCELLED | OUTPATIENT
Start: 2018-06-02

## 2018-06-01 RX ORDER — GUAIFENESIN 600 MG/1
1200 TABLET, EXTENDED RELEASE ORAL DAILY
Status: DISCONTINUED | OUTPATIENT
Start: 2018-06-02 | End: 2018-06-11 | Stop reason: HOSPADM

## 2018-06-01 RX ORDER — FLUDROCORTISONE ACETATE 0.1 MG/1
0.1 TABLET ORAL DAILY
Status: DISCONTINUED | OUTPATIENT
Start: 2018-06-02 | End: 2018-06-02

## 2018-06-01 RX ORDER — DOXYCYCLINE HYCLATE 100 MG
100 TABLET ORAL EVERY 12 HOURS SCHEDULED
Status: COMPLETED | OUTPATIENT
Start: 2018-06-01 | End: 2018-06-07

## 2018-06-01 RX ORDER — DOXYCYCLINE HYCLATE 100 MG
100 TABLET ORAL EVERY 12 HOURS SCHEDULED
Status: CANCELLED | OUTPATIENT
Start: 2018-06-01 | End: 2018-06-11

## 2018-06-01 RX ORDER — ALBUTEROL SULFATE 2.5 MG/3ML
2.5 SOLUTION RESPIRATORY (INHALATION)
Status: DISCONTINUED | OUTPATIENT
Start: 2018-06-01 | End: 2018-06-06

## 2018-06-01 RX ORDER — ALBUTEROL SULFATE 2.5 MG/3ML
2.5 SOLUTION RESPIRATORY (INHALATION)
Status: CANCELLED | OUTPATIENT
Start: 2018-06-01

## 2018-06-01 RX ORDER — LACTOBACILLUS RHAMNOSUS GG 10B CELL
1 CAPSULE ORAL DAILY
Status: DISCONTINUED | OUTPATIENT
Start: 2018-06-02 | End: 2018-06-11 | Stop reason: HOSPADM

## 2018-06-01 RX ORDER — PANTOPRAZOLE SODIUM 40 MG/1
40 TABLET, DELAYED RELEASE ORAL
Status: CANCELLED | OUTPATIENT
Start: 2018-06-02

## 2018-06-01 RX ORDER — FLUDROCORTISONE ACETATE 0.1 MG/1
0.1 TABLET ORAL DAILY
Status: CANCELLED | OUTPATIENT
Start: 2018-06-02

## 2018-06-01 RX ORDER — LACTOBACILLUS RHAMNOSUS GG 10B CELL
1 CAPSULE ORAL DAILY
Status: CANCELLED | OUTPATIENT
Start: 2018-06-02

## 2018-06-01 RX ADMIN — FLUDROCORTISONE ACETATE 0.1 MG: 0.1 TABLET ORAL at 09:13

## 2018-06-01 RX ADMIN — ALBUTEROL SULFATE 2.5 MG: 2.5 SOLUTION RESPIRATORY (INHALATION) at 21:38

## 2018-06-01 RX ADMIN — DOXYCYCLINE HYCLATE 100 MG: 100 TABLET, COATED ORAL at 09:13

## 2018-06-01 RX ADMIN — PANTOPRAZOLE SODIUM 40 MG: 40 TABLET, DELAYED RELEASE ORAL at 05:38

## 2018-06-01 RX ADMIN — ALBUTEROL SULFATE 2.5 MG: 2.5 SOLUTION RESPIRATORY (INHALATION) at 17:44

## 2018-06-01 RX ADMIN — GUAIFENESIN 1200 MG: 600 TABLET, EXTENDED RELEASE ORAL at 09:13

## 2018-06-01 RX ADMIN — Medication 4 PUFF: at 21:43

## 2018-06-01 RX ADMIN — Medication 10 ML: at 09:14

## 2018-06-01 RX ADMIN — METOPROLOL TARTRATE 50 MG: 50 TABLET, FILM COATED ORAL at 22:29

## 2018-06-01 RX ADMIN — ASPIRIN 81 MG 81 MG: 81 TABLET ORAL at 09:13

## 2018-06-01 RX ADMIN — ACETAMINOPHEN 650 MG: 325 TABLET ORAL at 12:20

## 2018-06-01 RX ADMIN — DOXYCYCLINE HYCLATE 100 MG: 100 TABLET, COATED ORAL at 22:28

## 2018-06-01 RX ADMIN — Medication 1 CAPSULE: at 09:13

## 2018-06-01 RX ADMIN — METOPROLOL TARTRATE 50 MG: 50 TABLET, FILM COATED ORAL at 09:13

## 2018-06-01 RX ADMIN — ENOXAPARIN SODIUM 30 MG: 30 INJECTION SUBCUTANEOUS at 17:17

## 2018-06-01 RX ADMIN — MULTIPLE VITAMINS W/ MINERALS TAB 1 TABLET: TAB at 09:13

## 2018-06-01 ASSESSMENT — PAIN SCALES - GENERAL
PAINLEVEL_OUTOF10: 0
PAINLEVEL_OUTOF10: 5
PAINLEVEL_OUTOF10: 0
PAINLEVEL_OUTOF10: 0
PAINLEVEL_OUTOF10: 2

## 2018-06-01 ASSESSMENT — PAIN DESCRIPTION - PAIN TYPE: TYPE: ACUTE PAIN

## 2018-06-01 ASSESSMENT — PAIN DESCRIPTION - LOCATION: LOCATION: HEAD

## 2018-06-01 ASSESSMENT — PAIN DESCRIPTION - DESCRIPTORS: DESCRIPTORS: HEADACHE

## 2018-06-01 NOTE — PROGRESS NOTES
1225 Report called to Mattie in [address] [place] [address]. Patient and family notified of being discharged and admitted to [address] [place] [address].

## 2018-06-01 NOTE — DISCHARGE SUMMARY
Hospital Medicine Discharge Summary      Patient Identification:   Zak Palmer   : 1924  MRN: [id number]   Account: [de-identified]      Patient's PCP: Mendel Mura, APRN - CNP    Admit Date: 2018     Discharge Date:   18      Admitting Physician: Juan Pablo Voss MD     Discharge Physician: Syeda Hernandez MD     Discharge Diagnoses: Active Hospital Problems    Diagnosis Date Noted    Hypo-osmolality and hyponatremia (CODE) [E87.1] 2018    FTT (failure to thrive) in adult [R62.7] 2018    Acute on chronic diastolic congestive heart failure (HCC) [I50.33] 2018    NIECY (acute kidney injury) ([facility] [N17.9] 2018    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 Hospital Course:   Eugenie Anderson Lisha Spencer is a 80 y.o. female admitted to [address] [place] [address] on 2018 for  acute generalized weakness, poor appetite, chronic cough and sob. Treated for acute hyponatremia w/ likely multifactorial etiology. Seen by nephrology and patient started on Florinef. Possible pnuemonia induced SIADH from renal point of view. Serum sodium on admission was 120 with serum osmolality of 246. Improved respiratory symptoms -on doxycycline for acute on chronic bronchitis/ bronchiectasis w/ underlying scoliosis and restrictive lung dx. CT lung shows increased micronodules a left basilar mass. Possible post-obstructive pnuemonia too. Seen by pulmonary. High risk patient for diagnostic bronchoscope , BAL for cultures ( atypical pneumonia and fungal infections) and pulmonary toilet.  O2 saturation is  95 % on room 0.0 - 1800.0 pg/mL 1417.0      MICROBIOLOGY & HISTOPATHOLOGY. Results for Ruben Cage (MRN [id number]) as of 5/30/2018 12:55    Ref. Range 5/29/2018 23:00   Color, UA Latest Ref Range: STRAW-YELL  YELLOW   Glucose, UA Latest Ref Range: NEGATIVE mg/dl 100 (A)   Bilirubin, Urine Latest Ref Range: NEGATIVE  NEGATIVE   Ketones, Urine Latest Ref Range: NEGATIVE  TRACE (A)   Blood, Urine Latest Ref Range: NEGATIVE  TRACE (A)   pH, UA Latest Ref Range: 5.0 - 9.0  5.0   Protein, UA Latest Ref Range: NEGATIVE  NEGATIVE   Urobilinogen, Urine Latest Ref Range: 0.0 - 1.0 eu/dl 1.0   Nitrite, Urine Latest Ref Range: NEGATIVE  NEGATIVE   Leukocyte Esterase, Urine Latest Ref Range: NEGATIVE  SMALL (A)   Casts UA Latest Ref Range: NONE SEEN /lpf 4-8 HYALINE   CASTS 2 Latest Ref Range: NONE SEEN /lpf NONE SEEN   Mucus, UA Latest Ref Range: NONE SEEN/  NONE SEEN   WBC, UA Latest Ref Range: 0-4/hpf /hpf 2-4   RBC, UA Latest Ref Range: 0-2/hpf /hpf 3-5   Epi Cells Latest Ref Range: 3-5/hpf /hpf 0-2   Renal Epithelial, Urine Latest Ref Range: NONE SEEN  NONE SEEN   Bacteria, UA Latest Ref Range: FEW/NONE S /hpf MODERATE   Amorphous, UA Latest Ref Range: NONE SEEN  URATES   Yeast, Urine Latest Ref Range: NONE SEEN  NONE SEEN   Crystals Latest Ref Range: NONE SEEN  URIC ACID   Character, Urine Latest Ref Range: CLEAR-SL C  CLOUDY (A)   Urine Culture Reflex Unknown No growth-prelimi. .. Myoglobin, Ur Latest Ref Range: NEGATIVE  POSITIVE (A)   Creatinine, Urine Latest Units: mg/dl 112.9   Osmolality, Ur Latest Ref Range: 250 - 750 mosmol/kg 382   Potassium, Urine Latest Units: meq/l 40.8   Sodium, Ur Latest Units: meq/l 41   Specific Gravity, Urine Latest Ref Range: 1.002 - 1.03  1.018   Specimen Unknown URINE   MISCELLANEOUS 2 Unknown NONE SEEN   Eosinophil Smear Latest Ref Range: NONE SEEN  NONE SEEN      Results for Ruben Cage (MRN [id number]) as of 5/30/2018 12:55    Ref.  Range 5/29/2018 12:26   Blood Culture, Routine Unknown No growth-prelimi. ..      Results for Adarsh Mclaughlin (MRN [id number]) as of 5/30/2018 12:55    Ref. Range 5/29/2018 12:37   Blood Culture, Routine Unknown No growth-prelimi. ..         ENDOSCOPE STUDIES. None.      PROCEDURES. None.     RADIOLOGY. CXR-5/29/2018. 1. Left mid and bilateral lower lung atelectasis/infiltrate. 2. Prominent pulmonary interstitium suspicious for pulmonary edema.     CT CHEST W/O CONTRAST-5/29/2018. Somewhat mixed results with the decrease previous findings some extent and worsening other   extent as detailed above.      Most significantly appears to be an increase of micronodules there is no change in the left   medial basilar mass but there is  improved aeration other left lower lobe opacities.      Single new 3 mm nodular density posterior right upper lung. Addition to additional alveolar opacities in the posterior right upper lung.     ECHO-5/29/2018. Left ventricle size is normal.  Mild concentric left ventricular hypertrophy. Mildly increased left ventricle wall thickness. There were no regional wall motion abnormalities. Systolic function was normal.  Ejection fraction is visually estimated at 70%. Doppler parameters were consistent with abnormal left ventricular  relaxation (grade 1 diastolic dysfunction). The left atrium is Moderately dilated. Consults:     IP CONSULT TO SOCIAL WORK  IP CONSULT TO NEPHROLOGY  IP CONSULT TO REHAB/TCU ADMISSION COORDINATOR  IP CONSULT TO PULMONOLOGY  IP CONSULT TO DIETITIAN  IP CONSULT TO REHAB/TCU ADMISSION COORDINATOR  IP CONSULT TO PULMONOLOGY  IP CONSULT TO DIETITIAN  IP CONSULT TO RECREATION THERAPY  IP CONSULT TO SOCIAL WORK    Disposition:    [] Home       [x] TCU       [] Rehab       [] Psych       [] SNF       [] [place]       [] Other-    Condition at Discharge: Stable    Code Status:  DNR-CCA     Patient Instructions:    Discharge lab work:    Activity: activity as tolerated  Diet: DIET GENERAL; Low

## 2018-06-01 NOTE — PLAN OF CARE
Problem: Falls - Risk of:  Goal: Will remain free from falls  Will remain free from falls   Outcome: Ongoing  No falls noted this shift. Continue falling star program. Bed alarm on, bed in low position. Call light and personal belongings in reach. Patient uses call light appropriately. Problem: Risk for Impaired Skin Integrity  Goal: Tissue integrity - skin and mucous membranes  Structural intactness and normal physiological function of skin and  mucous membranes. Outcome: Ongoing  No new breakdown this shift. Continues to have stage 1 on buttocks. Turns self in bed. Will monitor    Problem: Discharge Planning:  Goal: Discharged to appropriate level of care  Discharged to appropriate level of care    Outcome: Ongoing  Precert in for TCU. Awaiting word from them. Problem: Pain:  Goal: Pain level will decrease  Pain level will decrease   Outcome: Ongoing  Patient reports pain of 3 in head. PRN tylenol administered. Will monitor    Problem: Nutrition  Goal: Optimal nutrition therapy  Outcome: Ongoing  Patient has had minimal oral intake this shift. Will encourage oral intake. Problem: Airway Clearance - Ineffective:  Goal: Clear lung sounds  Clear lung sounds   Outcome: Ongoing  Lungs have ronchi and are diminished throughout. Will monitor. O2 greater than 92% on RA. Will monitor    Comments: Care plan reviewed with patient. Patient verbalizes understanding of the plan of care and contribute to goal setting.

## 2018-06-01 NOTE — PROGRESS NOTES
atelectasis infiltrate or mass. Significant improved aeration of the more anterior lateral    left lower lobe compared the prior exam. No significant change in appearance of the right lower lobe except the decrease in size of the previously seen 19 x 13 mm medial basilar density which now measures 13 x 14 mm severe bronchiectasis which is most    severe in the lower lungs. Single new rounded opacity posterior right upper lung measuring 3.3 mm. Focal atelectasis versus a nodule   Severe scoliosis. No suspicious bone lesions are seen. Upper abdomen no specific abnormality           Impression   Somewhat mixed results with the decrease previous findings some extent and worsening other extent as detailed above. Most significantly appears to be an increase of micronodules there is no change in the left medial basilar mass but there is    improved aeration other left lower lobe opacities. Single new 3 mm nodular density posterior right upper lung. Addition to additional alveolar opacities in the posterior right upper lung.             CT chest 3/07/2018  1. There is bronchiectasis demonstrated at the upper and lower lung zones bilaterally.   2. Focal ovoid opacity at the medial left lung base measuring 3.3 x 2.1 cm appears increased in size from the prior examination dated 7/7/2017 previously measuring 3 x 1.8 cm. Differential considerations would include focal round atelectasis versus an inflammatory or infectious process or a neoplastic process. Further evaluation can be obtained with PET/CT and/or biopsy.   3. There is an irregular confluent opacity at the medial right lung base which measures 1.3 x 1.9 cm. This is indeterminate in etiology and may represent an inflammatory or infectious process. However, recommend continued follow-up to document resolution.   4.  More focal dependent consolidative airspace disease at the right lung base may represent atelectasis or pneumonia.   5. Scattered small micronodular densities are demonstrated at the lower lobes bilaterally. This may be related to an inflammatory or infectious process to include an atypical mycobacterial infectious process.   6. There is focal opacity demonstrated within an area of the left lower lobe which may represent mucous plugging. This is demonstrated on axial image 28.   7. There is an indeterminate 6 mm pulmonary nodule at the left lung base on axial image 28. This is not significantly changed from the prior examination. However, recommend continued follow-up.           (See actual reports for details)    Assessment   Bronchiectasis with mucopurulent bronchitis: At baseline status. 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 Plan is for TCU    Case discussed with nurse and patient/family/Dr. Estuardo Devine. Questions and concerns addressed. Meds and Orders reviewed. Electronically signed by   JARROD Alfonso CNP on 6/1/2018 at 10:01 AM     Addendum by Dr. Estuardo Devine MD:  I have seen and examined the patient independently. Face to face evaluation and examination was performed. The above evaluation and note has been reviewed. Labs and radiographs were reviewed. I Have discussed with Ms. Ceci Gonsales. GERALD Henson about this patient in detail. D/w Patient's R.N. and specific instructions given. Patient issues addressed. The above assessment and plan has been reviewed. Please see my modifications mentioned below. My modifications:  She is on room air.   She is

## 2018-06-01 NOTE — PROGRESS NOTES
Spoke with Dr. Evgeny Zabala regarding bradycardia. Orders for trops, EKG, and electrolyte checks. Orders to call on Na level when resulted. 0045: Dr. Evgeny Zabala updated Na level and on Dr. Melissa Elias. Orders for urine osmolality for the AM and for 1800ml fluid restriction.

## 2018-06-01 NOTE — FLOWSHEET NOTE
Pt is sleeping, but her daughter is in the room. I had conversation with her. She stated my mom would love to have you stop by and pray with her later. Follow up is needed. 06/01/18 0930   Encounter Summary   Services provided to: Patient and family together   Referral/Consult From: [address] of [address] Visiting Yes  (6/1 )   Complexity of Encounter Low   Length of Encounter 15 minutes   Routine   Type Initial   Assessment Sleeping   Intervention Nurtured hope   Outcome Engaged in conversation   Spiritual care card with [facility], prayer or Prayer for peace was given. It has our information of service, hours and phone number on it.

## 2018-06-02 PROBLEM — E87.1 CHRONIC HYPONATREMIA: Status: ACTIVE | Noted: 2018-06-02

## 2018-06-02 PROBLEM — I51.7 LEFT VENTRICULAR HYPERTROPHY: Status: ACTIVE | Noted: 2018-06-02

## 2018-06-02 PROBLEM — I51.89 DIASTOLIC DYSFUNCTION: Status: ACTIVE | Noted: 2018-06-02

## 2018-06-02 LAB
EKG ATRIAL RATE: 67 BPM
EKG P AXIS: 83 DEGREES
EKG P-R INTERVAL: 156 MS
EKG Q-T INTERVAL: 418 MS
EKG QRS DURATION: 96 MS
EKG QTC CALCULATION (BAZETT): 441 MS
EKG R AXIS: 69 DEGREES
EKG T AXIS: 75 DEGREES
EKG VENTRICULAR RATE: 67 BPM

## 2018-06-02 PROCEDURE — 97110 THERAPEUTIC EXERCISES: CPT

## 2018-06-02 PROCEDURE — 94640 AIRWAY INHALATION TREATMENT: CPT

## 2018-06-02 PROCEDURE — 99232 SBSQ HOSP IP/OBS MODERATE 35: CPT | Performed by: INTERNAL MEDICINE

## 2018-06-02 PROCEDURE — 6360000002 HC RX W HCPCS: Performed by: INTERNAL MEDICINE

## 2018-06-02 PROCEDURE — 6370000000 HC RX 637 (ALT 250 FOR IP): Performed by: INTERNAL MEDICINE

## 2018-06-02 PROCEDURE — 97163 PT EVAL HIGH COMPLEX 45 MIN: CPT

## 2018-06-02 PROCEDURE — 1290000000 HC SEMI PRIVATE OTHER R&B

## 2018-06-02 PROCEDURE — 94669 MECHANICAL CHEST WALL OSCILL: CPT

## 2018-06-02 PROCEDURE — 97530 THERAPEUTIC ACTIVITIES: CPT

## 2018-06-02 PROCEDURE — 6370000000 HC RX 637 (ALT 250 FOR IP): Performed by: FAMILY MEDICINE

## 2018-06-02 RX ORDER — SENNA PLUS 8.6 MG/1
1 TABLET ORAL NIGHTLY PRN
Status: DISCONTINUED | OUTPATIENT
Start: 2018-06-02 | End: 2018-06-11 | Stop reason: HOSPADM

## 2018-06-02 RX ORDER — POLYETHYLENE GLYCOL 3350 17 G/17G
17 POWDER, FOR SOLUTION ORAL DAILY PRN
Status: DISCONTINUED | OUTPATIENT
Start: 2018-06-02 | End: 2018-06-11 | Stop reason: HOSPADM

## 2018-06-02 RX ORDER — DOCUSATE SODIUM 100 MG/1
100 CAPSULE, LIQUID FILLED ORAL DAILY PRN
Status: DISCONTINUED | OUTPATIENT
Start: 2018-06-02 | End: 2018-06-11 | Stop reason: HOSPADM

## 2018-06-02 RX ORDER — FLUDROCORTISONE ACETATE 0.1 MG/1
0.2 TABLET ORAL DAILY
Status: DISCONTINUED | OUTPATIENT
Start: 2018-06-03 | End: 2018-06-07

## 2018-06-02 RX ADMIN — ALBUTEROL SULFATE 2.5 MG: 2.5 SOLUTION RESPIRATORY (INHALATION) at 19:59

## 2018-06-02 RX ADMIN — Medication 4 PUFF: at 07:55

## 2018-06-02 RX ADMIN — ASPIRIN 81 MG 81 MG: 81 TABLET ORAL at 09:51

## 2018-06-02 RX ADMIN — ALBUTEROL SULFATE 2.5 MG: 2.5 SOLUTION RESPIRATORY (INHALATION) at 16:09

## 2018-06-02 RX ADMIN — ALBUTEROL SULFATE 2.5 MG: 2.5 SOLUTION RESPIRATORY (INHALATION) at 12:18

## 2018-06-02 RX ADMIN — DOCUSATE SODIUM 100 MG: 100 CAPSULE, LIQUID FILLED ORAL at 22:04

## 2018-06-02 RX ADMIN — GUAIFENESIN 1200 MG: 600 TABLET, EXTENDED RELEASE ORAL at 09:51

## 2018-06-02 RX ADMIN — FLUDROCORTISONE ACETATE 0.1 MG: 0.1 TABLET ORAL at 09:51

## 2018-06-02 RX ADMIN — ENOXAPARIN SODIUM 30 MG: 30 INJECTION SUBCUTANEOUS at 17:45

## 2018-06-02 RX ADMIN — METOPROLOL TARTRATE 50 MG: 50 TABLET, FILM COATED ORAL at 09:51

## 2018-06-02 RX ADMIN — MULTIPLE VITAMINS W/ MINERALS TAB 1 TABLET: TAB at 09:51

## 2018-06-02 RX ADMIN — ALBUTEROL SULFATE 2.5 MG: 2.5 SOLUTION RESPIRATORY (INHALATION) at 07:43

## 2018-06-02 RX ADMIN — SENNA 8.6 MG: 8.6 TABLET, COATED ORAL at 22:01

## 2018-06-02 RX ADMIN — Medication 4 PUFF: at 19:59

## 2018-06-02 RX ADMIN — DOXYCYCLINE HYCLATE 100 MG: 100 TABLET, COATED ORAL at 21:59

## 2018-06-02 RX ADMIN — Medication 1 CAPSULE: at 09:51

## 2018-06-02 RX ADMIN — PANTOPRAZOLE SODIUM 40 MG: 40 TABLET, DELAYED RELEASE ORAL at 06:19

## 2018-06-02 RX ADMIN — METOPROLOL TARTRATE 50 MG: 50 TABLET, FILM COATED ORAL at 21:59

## 2018-06-02 RX ADMIN — DOXYCYCLINE HYCLATE 100 MG: 100 TABLET, COATED ORAL at 09:51

## 2018-06-02 ASSESSMENT — PAIN SCALES - GENERAL: PAINLEVEL_OUTOF10: 0

## 2018-06-03 PROCEDURE — 6360000002 HC RX W HCPCS: Performed by: INTERNAL MEDICINE

## 2018-06-03 PROCEDURE — 94640 AIRWAY INHALATION TREATMENT: CPT

## 2018-06-03 PROCEDURE — A4328 FEM URINARY COLLECT POUCH: HCPCS

## 2018-06-03 PROCEDURE — 99231 SBSQ HOSP IP/OBS SF/LOW 25: CPT | Performed by: INTERNAL MEDICINE

## 2018-06-03 PROCEDURE — 97530 THERAPEUTIC ACTIVITIES: CPT

## 2018-06-03 PROCEDURE — G8988 SELF CARE GOAL STATUS: HCPCS

## 2018-06-03 PROCEDURE — 6370000000 HC RX 637 (ALT 250 FOR IP): Performed by: INTERNAL MEDICINE

## 2018-06-03 PROCEDURE — 2500000003 HC RX 250 WO HCPCS: Performed by: INTERNAL MEDICINE

## 2018-06-03 PROCEDURE — 2580000003 HC RX 258: Performed by: FAMILY MEDICINE

## 2018-06-03 PROCEDURE — G8987 SELF CARE CURRENT STATUS: HCPCS

## 2018-06-03 PROCEDURE — 97535 SELF CARE MNGMENT TRAINING: CPT

## 2018-06-03 PROCEDURE — 97167 OT EVAL HIGH COMPLEX 60 MIN: CPT

## 2018-06-03 PROCEDURE — 94669 MECHANICAL CHEST WALL OSCILL: CPT

## 2018-06-03 PROCEDURE — 1290000000 HC SEMI PRIVATE OTHER R&B

## 2018-06-03 RX ORDER — SODIUM CHLORIDE 0.9 % (FLUSH) 0.9 %
10 SYRINGE (ML) INJECTION 2 TIMES DAILY
Status: DISCONTINUED | OUTPATIENT
Start: 2018-06-03 | End: 2018-06-06

## 2018-06-03 RX ORDER — SODIUM CHLORIDE 0.9 % (FLUSH) 0.9 %
10 SYRINGE (ML) INJECTION PRN
Status: DISCONTINUED | OUTPATIENT
Start: 2018-06-03 | End: 2018-06-06

## 2018-06-03 RX ADMIN — ENOXAPARIN SODIUM 30 MG: 30 INJECTION SUBCUTANEOUS at 17:52

## 2018-06-03 RX ADMIN — DOXYCYCLINE HYCLATE 100 MG: 100 TABLET, COATED ORAL at 09:20

## 2018-06-03 RX ADMIN — ALBUTEROL SULFATE 2.5 MG: 2.5 SOLUTION RESPIRATORY (INHALATION) at 20:07

## 2018-06-03 RX ADMIN — ALBUTEROL SULFATE 2.5 MG: 2.5 SOLUTION RESPIRATORY (INHALATION) at 11:44

## 2018-06-03 RX ADMIN — ASPIRIN 81 MG 81 MG: 81 TABLET ORAL at 09:19

## 2018-06-03 RX ADMIN — FLUDROCORTISONE ACETATE 0.2 MG: 0.1 TABLET ORAL at 09:20

## 2018-06-03 RX ADMIN — DOXYCYCLINE HYCLATE 100 MG: 100 TABLET, COATED ORAL at 21:39

## 2018-06-03 RX ADMIN — Medication 4 PUFF: at 20:08

## 2018-06-03 RX ADMIN — MULTIPLE VITAMINS W/ MINERALS TAB 1 TABLET: TAB at 09:19

## 2018-06-03 RX ADMIN — GUAIFENESIN 1200 MG: 600 TABLET, EXTENDED RELEASE ORAL at 09:20

## 2018-06-03 RX ADMIN — METOPROLOL TARTRATE 50 MG: 50 TABLET, FILM COATED ORAL at 21:39

## 2018-06-03 RX ADMIN — Medication 10 ML: at 11:47

## 2018-06-03 RX ADMIN — Medication 1 CAPSULE: at 09:20

## 2018-06-03 RX ADMIN — ALBUTEROL SULFATE 2.5 MG: 2.5 SOLUTION RESPIRATORY (INHALATION) at 07:35

## 2018-06-03 RX ADMIN — Medication 5 UNITS: at 13:19

## 2018-06-03 RX ADMIN — Medication 4 PUFF: at 07:35

## 2018-06-03 RX ADMIN — ACETAMINOPHEN 650 MG: 325 TABLET ORAL at 09:19

## 2018-06-03 RX ADMIN — Medication 10 ML: at 21:40

## 2018-06-03 RX ADMIN — ALBUTEROL SULFATE 2.5 MG: 2.5 SOLUTION RESPIRATORY (INHALATION) at 16:06

## 2018-06-03 RX ADMIN — PANTOPRAZOLE SODIUM 40 MG: 40 TABLET, DELAYED RELEASE ORAL at 05:27

## 2018-06-03 RX ADMIN — METOPROLOL TARTRATE 50 MG: 50 TABLET, FILM COATED ORAL at 09:19

## 2018-06-03 ASSESSMENT — PAIN SCALES - GENERAL
PAINLEVEL_OUTOF10: 0
PAINLEVEL_OUTOF10: 0
PAINLEVEL_OUTOF10: 1
PAINLEVEL_OUTOF10: 0
PAINLEVEL_OUTOF10: 0

## 2018-06-04 LAB
ANION GAP SERPL CALCULATED.3IONS-SCNC: 13 MEQ/L (ref 8–16)
BLOOD CULTURE, ROUTINE: NORMAL
BLOOD CULTURE, ROUTINE: NORMAL
BUN BLDV-MCNC: 26 MG/DL (ref 7–22)
CALCIUM SERPL-MCNC: 8.4 MG/DL (ref 8.5–10.5)
CHLORIDE BLD-SCNC: 93 MEQ/L (ref 98–111)
CO2: 27 MEQ/L (ref 23–33)
CREAT SERPL-MCNC: 0.6 MG/DL (ref 0.4–1.2)
GFR SERPL CREATININE-BSD FRML MDRD: > 90 ML/MIN/1.73M2
GLUCOSE BLD-MCNC: 108 MG/DL (ref 70–108)
POTASSIUM SERPL-SCNC: 4.3 MEQ/L (ref 3.5–5.2)
SODIUM BLD-SCNC: 133 MEQ/L (ref 135–145)

## 2018-06-04 PROCEDURE — 6370000000 HC RX 637 (ALT 250 FOR IP): Performed by: INTERNAL MEDICINE

## 2018-06-04 PROCEDURE — 97110 THERAPEUTIC EXERCISES: CPT

## 2018-06-04 PROCEDURE — 99232 SBSQ HOSP IP/OBS MODERATE 35: CPT | Performed by: INTERNAL MEDICINE

## 2018-06-04 PROCEDURE — 6360000002 HC RX W HCPCS: Performed by: INTERNAL MEDICINE

## 2018-06-04 PROCEDURE — 2580000003 HC RX 258: Performed by: FAMILY MEDICINE

## 2018-06-04 PROCEDURE — 97535 SELF CARE MNGMENT TRAINING: CPT

## 2018-06-04 PROCEDURE — 97530 THERAPEUTIC ACTIVITIES: CPT

## 2018-06-04 PROCEDURE — 94640 AIRWAY INHALATION TREATMENT: CPT

## 2018-06-04 PROCEDURE — 94669 MECHANICAL CHEST WALL OSCILL: CPT

## 2018-06-04 PROCEDURE — 36415 COLL VENOUS BLD VENIPUNCTURE: CPT

## 2018-06-04 PROCEDURE — 97116 GAIT TRAINING THERAPY: CPT

## 2018-06-04 PROCEDURE — 1290000000 HC SEMI PRIVATE OTHER R&B

## 2018-06-04 PROCEDURE — 80048 BASIC METABOLIC PNL TOTAL CA: CPT

## 2018-06-04 RX ADMIN — PANTOPRAZOLE SODIUM 40 MG: 40 TABLET, DELAYED RELEASE ORAL at 09:07

## 2018-06-04 RX ADMIN — DOXYCYCLINE HYCLATE 100 MG: 100 TABLET, COATED ORAL at 22:29

## 2018-06-04 RX ADMIN — Medication 4 PUFF: at 21:28

## 2018-06-04 RX ADMIN — DOXYCYCLINE HYCLATE 100 MG: 100 TABLET, COATED ORAL at 09:08

## 2018-06-04 RX ADMIN — ASPIRIN 81 MG 81 MG: 81 TABLET ORAL at 09:08

## 2018-06-04 RX ADMIN — ALBUTEROL SULFATE 2.5 MG: 2.5 SOLUTION RESPIRATORY (INHALATION) at 21:23

## 2018-06-04 RX ADMIN — ALBUTEROL SULFATE 2.5 MG: 2.5 SOLUTION RESPIRATORY (INHALATION) at 09:06

## 2018-06-04 RX ADMIN — MULTIPLE VITAMINS W/ MINERALS TAB 1 TABLET: TAB at 09:07

## 2018-06-04 RX ADMIN — METOPROLOL TARTRATE 50 MG: 50 TABLET, FILM COATED ORAL at 22:29

## 2018-06-04 RX ADMIN — Medication 1 CAPSULE: at 09:07

## 2018-06-04 RX ADMIN — ENOXAPARIN SODIUM 30 MG: 30 INJECTION SUBCUTANEOUS at 17:56

## 2018-06-04 RX ADMIN — ALBUTEROL SULFATE 2.5 MG: 2.5 SOLUTION RESPIRATORY (INHALATION) at 13:35

## 2018-06-04 RX ADMIN — ALBUTEROL SULFATE 2.5 MG: 2.5 SOLUTION RESPIRATORY (INHALATION) at 18:43

## 2018-06-04 RX ADMIN — METOPROLOL TARTRATE 50 MG: 50 TABLET, FILM COATED ORAL at 09:07

## 2018-06-04 RX ADMIN — Medication 10 ML: at 22:30

## 2018-06-04 RX ADMIN — GUAIFENESIN 1200 MG: 600 TABLET, EXTENDED RELEASE ORAL at 09:08

## 2018-06-04 RX ADMIN — Medication 4 PUFF: at 09:06

## 2018-06-04 RX ADMIN — Medication 10 ML: at 09:08

## 2018-06-04 RX ADMIN — FLUDROCORTISONE ACETATE 0.2 MG: 0.1 TABLET ORAL at 09:08

## 2018-06-05 PROCEDURE — 2700000000 HC OXYGEN THERAPY PER DAY

## 2018-06-05 PROCEDURE — 6360000002 HC RX W HCPCS: Performed by: INTERNAL MEDICINE

## 2018-06-05 PROCEDURE — 6370000000 HC RX 637 (ALT 250 FOR IP): Performed by: FAMILY MEDICINE

## 2018-06-05 PROCEDURE — 97110 THERAPEUTIC EXERCISES: CPT

## 2018-06-05 PROCEDURE — 1290000000 HC SEMI PRIVATE OTHER R&B

## 2018-06-05 PROCEDURE — 2580000003 HC RX 258: Performed by: FAMILY MEDICINE

## 2018-06-05 PROCEDURE — 97116 GAIT TRAINING THERAPY: CPT

## 2018-06-05 PROCEDURE — 6370000000 HC RX 637 (ALT 250 FOR IP): Performed by: INTERNAL MEDICINE

## 2018-06-05 PROCEDURE — 97530 THERAPEUTIC ACTIVITIES: CPT

## 2018-06-05 PROCEDURE — 94640 AIRWAY INHALATION TREATMENT: CPT

## 2018-06-05 PROCEDURE — 99232 SBSQ HOSP IP/OBS MODERATE 35: CPT | Performed by: INTERNAL MEDICINE

## 2018-06-05 PROCEDURE — 94669 MECHANICAL CHEST WALL OSCILL: CPT

## 2018-06-05 RX ADMIN — ASPIRIN 81 MG 81 MG: 81 TABLET ORAL at 09:51

## 2018-06-05 RX ADMIN — ALBUTEROL SULFATE 2.5 MG: 2.5 SOLUTION RESPIRATORY (INHALATION) at 20:29

## 2018-06-05 RX ADMIN — FLUDROCORTISONE ACETATE 0.2 MG: 0.1 TABLET ORAL at 09:51

## 2018-06-05 RX ADMIN — METOPROLOL TARTRATE 50 MG: 50 TABLET, FILM COATED ORAL at 21:50

## 2018-06-05 RX ADMIN — Medication 1 CAPSULE: at 09:52

## 2018-06-05 RX ADMIN — PANTOPRAZOLE SODIUM 40 MG: 40 TABLET, DELAYED RELEASE ORAL at 09:51

## 2018-06-05 RX ADMIN — Medication 10 ML: at 09:52

## 2018-06-05 RX ADMIN — METOPROLOL TARTRATE 50 MG: 50 TABLET, FILM COATED ORAL at 09:51

## 2018-06-05 RX ADMIN — ALBUTEROL SULFATE 2.5 MG: 2.5 SOLUTION RESPIRATORY (INHALATION) at 08:50

## 2018-06-05 RX ADMIN — DOXYCYCLINE HYCLATE 100 MG: 100 TABLET, COATED ORAL at 09:51

## 2018-06-05 RX ADMIN — POLYETHYLENE GLYCOL 3350 17 G: 17 POWDER, FOR SOLUTION ORAL at 09:52

## 2018-06-05 RX ADMIN — ENOXAPARIN SODIUM 30 MG: 30 INJECTION SUBCUTANEOUS at 17:46

## 2018-06-05 RX ADMIN — Medication 4 PUFF: at 20:29

## 2018-06-05 RX ADMIN — Medication 4 PUFF: at 08:58

## 2018-06-05 RX ADMIN — GUAIFENESIN 1200 MG: 600 TABLET, EXTENDED RELEASE ORAL at 09:51

## 2018-06-05 RX ADMIN — MULTIPLE VITAMINS W/ MINERALS TAB 1 TABLET: TAB at 09:51

## 2018-06-05 RX ADMIN — DOXYCYCLINE HYCLATE 100 MG: 100 TABLET, COATED ORAL at 21:50

## 2018-06-05 RX ADMIN — DOCUSATE SODIUM 100 MG: 100 CAPSULE, LIQUID FILLED ORAL at 09:52

## 2018-06-05 RX ADMIN — ALBUTEROL SULFATE 2.5 MG: 2.5 SOLUTION RESPIRATORY (INHALATION) at 12:05

## 2018-06-05 RX ADMIN — ALBUTEROL SULFATE 2.5 MG: 2.5 SOLUTION RESPIRATORY (INHALATION) at 15:56

## 2018-06-06 PROCEDURE — 6370000000 HC RX 637 (ALT 250 FOR IP): Performed by: FAMILY MEDICINE

## 2018-06-06 PROCEDURE — 6370000000 HC RX 637 (ALT 250 FOR IP): Performed by: INTERNAL MEDICINE

## 2018-06-06 PROCEDURE — 99232 SBSQ HOSP IP/OBS MODERATE 35: CPT | Performed by: INTERNAL MEDICINE

## 2018-06-06 PROCEDURE — 6370000000 HC RX 637 (ALT 250 FOR IP): Performed by: NURSE PRACTITIONER

## 2018-06-06 PROCEDURE — 6360000002 HC RX W HCPCS: Performed by: INTERNAL MEDICINE

## 2018-06-06 PROCEDURE — 1290000000 HC SEMI PRIVATE OTHER R&B

## 2018-06-06 PROCEDURE — 97116 GAIT TRAINING THERAPY: CPT

## 2018-06-06 PROCEDURE — 6360000002 HC RX W HCPCS: Performed by: NURSE PRACTITIONER

## 2018-06-06 PROCEDURE — 94640 AIRWAY INHALATION TREATMENT: CPT

## 2018-06-06 PROCEDURE — 94669 MECHANICAL CHEST WALL OSCILL: CPT

## 2018-06-06 PROCEDURE — 97110 THERAPEUTIC EXERCISES: CPT

## 2018-06-06 PROCEDURE — 97530 THERAPEUTIC ACTIVITIES: CPT

## 2018-06-06 RX ORDER — BUDESONIDE 0.5 MG/2ML
0.5 INHALANT ORAL 2 TIMES DAILY
Status: DISCONTINUED | OUTPATIENT
Start: 2018-06-06 | End: 2018-06-11 | Stop reason: HOSPADM

## 2018-06-06 RX ORDER — IPRATROPIUM BROMIDE AND ALBUTEROL SULFATE 2.5; .5 MG/3ML; MG/3ML
1 SOLUTION RESPIRATORY (INHALATION) 4 TIMES DAILY
Qty: 360 ML | Refills: 11 | Status: SHIPPED | OUTPATIENT
Start: 2018-06-06

## 2018-06-06 RX ORDER — BUDESONIDE 0.5 MG/2ML
500 INHALANT ORAL 2 TIMES DAILY
Qty: 60 AMPULE | Refills: 11 | Status: SHIPPED | OUTPATIENT
Start: 2018-06-06

## 2018-06-06 RX ORDER — IPRATROPIUM BROMIDE AND ALBUTEROL SULFATE 2.5; .5 MG/3ML; MG/3ML
1 SOLUTION RESPIRATORY (INHALATION) 4 TIMES DAILY
Status: DISCONTINUED | OUTPATIENT
Start: 2018-06-06 | End: 2018-06-11 | Stop reason: HOSPADM

## 2018-06-06 RX ADMIN — IPRATROPIUM BROMIDE AND ALBUTEROL SULFATE 1 AMPULE: .5; 3 SOLUTION RESPIRATORY (INHALATION) at 19:50

## 2018-06-06 RX ADMIN — METOPROLOL TARTRATE 50 MG: 50 TABLET, FILM COATED ORAL at 08:46

## 2018-06-06 RX ADMIN — IPRATROPIUM BROMIDE AND ALBUTEROL SULFATE 1 AMPULE: .5; 3 SOLUTION RESPIRATORY (INHALATION) at 15:56

## 2018-06-06 RX ADMIN — DOCUSATE SODIUM 100 MG: 100 CAPSULE, LIQUID FILLED ORAL at 08:45

## 2018-06-06 RX ADMIN — POLYETHYLENE GLYCOL 3350 17 G: 17 POWDER, FOR SOLUTION ORAL at 08:45

## 2018-06-06 RX ADMIN — IPRATROPIUM BROMIDE AND ALBUTEROL SULFATE 1 AMPULE: .5; 3 SOLUTION RESPIRATORY (INHALATION) at 11:55

## 2018-06-06 RX ADMIN — DOXYCYCLINE HYCLATE 100 MG: 100 TABLET, COATED ORAL at 08:45

## 2018-06-06 RX ADMIN — GUAIFENESIN 1200 MG: 600 TABLET, EXTENDED RELEASE ORAL at 08:45

## 2018-06-06 RX ADMIN — PANTOPRAZOLE SODIUM 40 MG: 40 TABLET, DELAYED RELEASE ORAL at 05:16

## 2018-06-06 RX ADMIN — FLUDROCORTISONE ACETATE 0.2 MG: 0.1 TABLET ORAL at 08:45

## 2018-06-06 RX ADMIN — ASPIRIN 81 MG 81 MG: 81 TABLET ORAL at 08:45

## 2018-06-06 RX ADMIN — MULTIPLE VITAMINS W/ MINERALS TAB 1 TABLET: TAB at 08:46

## 2018-06-06 RX ADMIN — METOPROLOL TARTRATE 50 MG: 50 TABLET, FILM COATED ORAL at 22:02

## 2018-06-06 RX ADMIN — ALBUTEROL SULFATE 2.5 MG: 2.5 SOLUTION RESPIRATORY (INHALATION) at 07:46

## 2018-06-06 RX ADMIN — ENOXAPARIN SODIUM 30 MG: 30 INJECTION SUBCUTANEOUS at 08:44

## 2018-06-06 RX ADMIN — DOXYCYCLINE HYCLATE 100 MG: 100 TABLET, COATED ORAL at 21:57

## 2018-06-06 RX ADMIN — BUDESONIDE 500 MCG: 0.5 INHALANT RESPIRATORY (INHALATION) at 19:50

## 2018-06-06 RX ADMIN — Medication 4 PUFF: at 08:06

## 2018-06-06 RX ADMIN — Medication 1 CAPSULE: at 08:46

## 2018-06-06 ASSESSMENT — PAIN SCALES - GENERAL: PAINLEVEL_OUTOF10: 0

## 2018-06-07 LAB
ANION GAP SERPL CALCULATED.3IONS-SCNC: 11 MEQ/L (ref 8–16)
BUN BLDV-MCNC: 26 MG/DL (ref 7–22)
CALCIUM SERPL-MCNC: 8.6 MG/DL (ref 8.5–10.5)
CHLORIDE BLD-SCNC: 92 MEQ/L (ref 98–111)
CO2: 32 MEQ/L (ref 23–33)
CREAT SERPL-MCNC: 0.7 MG/DL (ref 0.4–1.2)
GFR SERPL CREATININE-BSD FRML MDRD: 78 ML/MIN/1.73M2
GLUCOSE BLD-MCNC: 103 MG/DL (ref 70–108)
POTASSIUM SERPL-SCNC: 5 MEQ/L (ref 3.5–5.2)
SODIUM BLD-SCNC: 135 MEQ/L (ref 135–145)

## 2018-06-07 PROCEDURE — 6370000000 HC RX 637 (ALT 250 FOR IP): Performed by: INTERNAL MEDICINE

## 2018-06-07 PROCEDURE — 1290000000 HC SEMI PRIVATE OTHER R&B

## 2018-06-07 PROCEDURE — 6360000002 HC RX W HCPCS: Performed by: NURSE PRACTITIONER

## 2018-06-07 PROCEDURE — 80048 BASIC METABOLIC PNL TOTAL CA: CPT

## 2018-06-07 PROCEDURE — 99232 SBSQ HOSP IP/OBS MODERATE 35: CPT | Performed by: INTERNAL MEDICINE

## 2018-06-07 PROCEDURE — 99999 PR OFFICE/OUTPT VISIT,PROCEDURE ONLY: CPT | Performed by: INTERNAL MEDICINE

## 2018-06-07 PROCEDURE — 94669 MECHANICAL CHEST WALL OSCILL: CPT

## 2018-06-07 PROCEDURE — 97110 THERAPEUTIC EXERCISES: CPT

## 2018-06-07 PROCEDURE — 6370000000 HC RX 637 (ALT 250 FOR IP): Performed by: NURSE PRACTITIONER

## 2018-06-07 PROCEDURE — 94640 AIRWAY INHALATION TREATMENT: CPT

## 2018-06-07 PROCEDURE — 36415 COLL VENOUS BLD VENIPUNCTURE: CPT

## 2018-06-07 PROCEDURE — 97535 SELF CARE MNGMENT TRAINING: CPT

## 2018-06-07 PROCEDURE — 97530 THERAPEUTIC ACTIVITIES: CPT

## 2018-06-07 PROCEDURE — 6360000002 HC RX W HCPCS: Performed by: INTERNAL MEDICINE

## 2018-06-07 PROCEDURE — 97116 GAIT TRAINING THERAPY: CPT

## 2018-06-07 RX ORDER — FLUDROCORTISONE ACETATE 0.1 MG/1
0.1 TABLET ORAL DAILY
Status: DISCONTINUED | OUTPATIENT
Start: 2018-06-07 | End: 2018-06-11 | Stop reason: HOSPADM

## 2018-06-07 RX ADMIN — FLUDROCORTISONE ACETATE 0.1 MG: 0.1 TABLET ORAL at 08:43

## 2018-06-07 RX ADMIN — BUDESONIDE 500 MCG: 0.5 INHALANT RESPIRATORY (INHALATION) at 21:41

## 2018-06-07 RX ADMIN — GUAIFENESIN 1200 MG: 600 TABLET, EXTENDED RELEASE ORAL at 08:43

## 2018-06-07 RX ADMIN — IPRATROPIUM BROMIDE AND ALBUTEROL SULFATE 1 AMPULE: .5; 3 SOLUTION RESPIRATORY (INHALATION) at 14:10

## 2018-06-07 RX ADMIN — PANTOPRAZOLE SODIUM 40 MG: 40 TABLET, DELAYED RELEASE ORAL at 05:08

## 2018-06-07 RX ADMIN — IPRATROPIUM BROMIDE AND ALBUTEROL SULFATE 1 AMPULE: .5; 3 SOLUTION RESPIRATORY (INHALATION) at 08:45

## 2018-06-07 RX ADMIN — MULTIPLE VITAMINS W/ MINERALS TAB 1 TABLET: TAB at 08:43

## 2018-06-07 RX ADMIN — METOPROLOL TARTRATE 50 MG: 50 TABLET, FILM COATED ORAL at 08:43

## 2018-06-07 RX ADMIN — ASPIRIN 81 MG 81 MG: 81 TABLET ORAL at 08:44

## 2018-06-07 RX ADMIN — DOXYCYCLINE HYCLATE 100 MG: 100 TABLET, COATED ORAL at 20:55

## 2018-06-07 RX ADMIN — IPRATROPIUM BROMIDE AND ALBUTEROL SULFATE 1 AMPULE: .5; 3 SOLUTION RESPIRATORY (INHALATION) at 17:59

## 2018-06-07 RX ADMIN — METOPROLOL TARTRATE 50 MG: 50 TABLET, FILM COATED ORAL at 20:55

## 2018-06-07 RX ADMIN — DOXYCYCLINE HYCLATE 100 MG: 100 TABLET, COATED ORAL at 08:43

## 2018-06-07 RX ADMIN — Medication 1 CAPSULE: at 08:43

## 2018-06-07 RX ADMIN — IPRATROPIUM BROMIDE AND ALBUTEROL SULFATE 1 AMPULE: .5; 3 SOLUTION RESPIRATORY (INHALATION) at 21:41

## 2018-06-07 RX ADMIN — ENOXAPARIN SODIUM 30 MG: 30 INJECTION SUBCUTANEOUS at 17:52

## 2018-06-07 RX ADMIN — BUDESONIDE 500 MCG: 0.5 INHALANT RESPIRATORY (INHALATION) at 08:45

## 2018-06-07 ASSESSMENT — PAIN SCALES - GENERAL
PAINLEVEL_OUTOF10: 0

## 2018-06-08 PROCEDURE — 0220000000 HC SKILLED NURSING FACILITY

## 2018-06-08 PROCEDURE — 94640 AIRWAY INHALATION TREATMENT: CPT

## 2018-06-08 PROCEDURE — 6360000002 HC RX W HCPCS: Performed by: INTERNAL MEDICINE

## 2018-06-08 PROCEDURE — 97110 THERAPEUTIC EXERCISES: CPT

## 2018-06-08 PROCEDURE — 1290000000 HC SEMI PRIVATE OTHER R&B

## 2018-06-08 PROCEDURE — 97530 THERAPEUTIC ACTIVITIES: CPT

## 2018-06-08 PROCEDURE — 97535 SELF CARE MNGMENT TRAINING: CPT

## 2018-06-08 PROCEDURE — 97116 GAIT TRAINING THERAPY: CPT

## 2018-06-08 PROCEDURE — 99232 SBSQ HOSP IP/OBS MODERATE 35: CPT | Performed by: NURSE PRACTITIONER

## 2018-06-08 PROCEDURE — 6360000002 HC RX W HCPCS: Performed by: NURSE PRACTITIONER

## 2018-06-08 PROCEDURE — 6370000000 HC RX 637 (ALT 250 FOR IP): Performed by: INTERNAL MEDICINE

## 2018-06-08 PROCEDURE — 99232 SBSQ HOSP IP/OBS MODERATE 35: CPT | Performed by: INTERNAL MEDICINE

## 2018-06-08 PROCEDURE — 6370000000 HC RX 637 (ALT 250 FOR IP): Performed by: NURSE PRACTITIONER

## 2018-06-08 PROCEDURE — 94669 MECHANICAL CHEST WALL OSCILL: CPT

## 2018-06-08 RX ADMIN — PANTOPRAZOLE SODIUM 40 MG: 40 TABLET, DELAYED RELEASE ORAL at 08:34

## 2018-06-08 RX ADMIN — BUDESONIDE 500 MCG: 0.5 INHALANT RESPIRATORY (INHALATION) at 08:00

## 2018-06-08 RX ADMIN — ASPIRIN 81 MG 81 MG: 81 TABLET ORAL at 08:34

## 2018-06-08 RX ADMIN — FLUDROCORTISONE ACETATE 0.1 MG: 0.1 TABLET ORAL at 08:34

## 2018-06-08 RX ADMIN — IPRATROPIUM BROMIDE AND ALBUTEROL SULFATE 1 AMPULE: .5; 3 SOLUTION RESPIRATORY (INHALATION) at 08:00

## 2018-06-08 RX ADMIN — METOPROLOL TARTRATE 50 MG: 50 TABLET, FILM COATED ORAL at 08:34

## 2018-06-08 RX ADMIN — IPRATROPIUM BROMIDE AND ALBUTEROL SULFATE 1 AMPULE: .5; 3 SOLUTION RESPIRATORY (INHALATION) at 15:35

## 2018-06-08 RX ADMIN — GUAIFENESIN 1200 MG: 600 TABLET, EXTENDED RELEASE ORAL at 08:34

## 2018-06-08 RX ADMIN — MULTIPLE VITAMINS W/ MINERALS TAB 1 TABLET: TAB at 08:34

## 2018-06-08 RX ADMIN — ENOXAPARIN SODIUM 30 MG: 30 INJECTION SUBCUTANEOUS at 20:55

## 2018-06-08 RX ADMIN — METOPROLOL TARTRATE 50 MG: 50 TABLET, FILM COATED ORAL at 20:55

## 2018-06-08 RX ADMIN — Medication 1 CAPSULE: at 08:34

## 2018-06-08 ASSESSMENT — PAIN SCALES - GENERAL
PAINLEVEL_OUTOF10: 0
PAINLEVEL_OUTOF10: 0

## 2018-06-09 PROCEDURE — 94669 MECHANICAL CHEST WALL OSCILL: CPT

## 2018-06-09 PROCEDURE — 1290000000 HC SEMI PRIVATE OTHER R&B

## 2018-06-09 PROCEDURE — 94640 AIRWAY INHALATION TREATMENT: CPT

## 2018-06-09 PROCEDURE — 6360000002 HC RX W HCPCS: Performed by: NURSE PRACTITIONER

## 2018-06-09 PROCEDURE — 6370000000 HC RX 637 (ALT 250 FOR IP): Performed by: NURSE PRACTITIONER

## 2018-06-09 PROCEDURE — 6370000000 HC RX 637 (ALT 250 FOR IP): Performed by: INTERNAL MEDICINE

## 2018-06-09 PROCEDURE — 6360000002 HC RX W HCPCS: Performed by: INTERNAL MEDICINE

## 2018-06-09 RX ADMIN — PANTOPRAZOLE SODIUM 40 MG: 40 TABLET, DELAYED RELEASE ORAL at 06:25

## 2018-06-09 RX ADMIN — ENOXAPARIN SODIUM 30 MG: 30 INJECTION SUBCUTANEOUS at 17:53

## 2018-06-09 RX ADMIN — MULTIPLE VITAMINS W/ MINERALS TAB 1 TABLET: TAB at 10:00

## 2018-06-09 RX ADMIN — IPRATROPIUM BROMIDE AND ALBUTEROL SULFATE 1 AMPULE: .5; 3 SOLUTION RESPIRATORY (INHALATION) at 19:34

## 2018-06-09 RX ADMIN — METOPROLOL TARTRATE 50 MG: 50 TABLET, FILM COATED ORAL at 10:00

## 2018-06-09 RX ADMIN — IPRATROPIUM BROMIDE AND ALBUTEROL SULFATE 1 AMPULE: .5; 3 SOLUTION RESPIRATORY (INHALATION) at 15:23

## 2018-06-09 RX ADMIN — FLUDROCORTISONE ACETATE 0.1 MG: 0.1 TABLET ORAL at 10:00

## 2018-06-09 RX ADMIN — Medication 1 CAPSULE: at 10:00

## 2018-06-09 RX ADMIN — GUAIFENESIN 1200 MG: 600 TABLET, EXTENDED RELEASE ORAL at 10:00

## 2018-06-09 RX ADMIN — METOPROLOL TARTRATE 50 MG: 50 TABLET, FILM COATED ORAL at 21:08

## 2018-06-09 RX ADMIN — ASPIRIN 81 MG 81 MG: 81 TABLET ORAL at 10:00

## 2018-06-09 RX ADMIN — BUDESONIDE 500 MCG: 0.5 INHALANT RESPIRATORY (INHALATION) at 19:42

## 2018-06-09 ASSESSMENT — PAIN SCALES - GENERAL: PAINLEVEL_OUTOF10: 0

## 2018-06-10 PROBLEM — E22.2 SIADH (SYNDROME OF INAPPROPRIATE ADH PRODUCTION) (HCC): Status: ACTIVE | Noted: 2018-06-10

## 2018-06-10 PROBLEM — D64.9 ANEMIA: Status: ACTIVE | Noted: 2018-06-10

## 2018-06-10 LAB
ANION GAP SERPL CALCULATED.3IONS-SCNC: 10 MEQ/L (ref 8–16)
BASOPHILS # BLD: 0.6 %
BASOPHILS ABSOLUTE: 0.1 THOU/MM3 (ref 0–0.1)
BUN BLDV-MCNC: 19 MG/DL (ref 7–22)
CALCIUM SERPL-MCNC: 8.4 MG/DL (ref 8.5–10.5)
CHLORIDE BLD-SCNC: 95 MEQ/L (ref 98–111)
CO2: 29 MEQ/L (ref 23–33)
CREAT SERPL-MCNC: 0.7 MG/DL (ref 0.4–1.2)
EOSINOPHIL # BLD: 2.2 %
EOSINOPHILS ABSOLUTE: 0.2 THOU/MM3 (ref 0–0.4)
GFR SERPL CREATININE-BSD FRML MDRD: 78 ML/MIN/1.73M2
GLUCOSE BLD-MCNC: 96 MG/DL (ref 70–108)
HCT VFR BLD CALC: 33.1 % (ref 37–47)
HEMOGLOBIN: 10.9 GM/DL (ref 12–16)
LYMPHOCYTES # BLD: 9.1 %
LYMPHOCYTES ABSOLUTE: 0.8 THOU/MM3 (ref 1–4.8)
MCH RBC QN AUTO: 29 PG (ref 27–31)
MCHC RBC AUTO-ENTMCNC: 32.8 GM/DL (ref 33–37)
MCV RBC AUTO: 88.3 FL (ref 81–99)
MONOCYTES # BLD: 8 %
MONOCYTES ABSOLUTE: 0.7 THOU/MM3 (ref 0.4–1.3)
NUCLEATED RED BLOOD CELLS: 0 /100 WBC
PDW BLD-RTO: 14.5 % (ref 11.5–14.5)
PLATELET # BLD: 285 THOU/MM3 (ref 130–400)
PMV BLD AUTO: 7.1 FL (ref 7.4–10.4)
POTASSIUM SERPL-SCNC: 4.2 MEQ/L (ref 3.5–5.2)
RBC # BLD: 3.75 MILL/MM3 (ref 4.2–5.4)
SEG NEUTROPHILS: 80.1 %
SEGMENTED NEUTROPHILS ABSOLUTE COUNT: 7.3 THOU/MM3 (ref 1.8–7.7)
SODIUM BLD-SCNC: 134 MEQ/L (ref 135–145)
WBC # BLD: 9.1 THOU/MM3 (ref 4.8–10.8)

## 2018-06-10 PROCEDURE — 6370000000 HC RX 637 (ALT 250 FOR IP): Performed by: FAMILY MEDICINE

## 2018-06-10 PROCEDURE — 94640 AIRWAY INHALATION TREATMENT: CPT

## 2018-06-10 PROCEDURE — 97116 GAIT TRAINING THERAPY: CPT

## 2018-06-10 PROCEDURE — 6370000000 HC RX 637 (ALT 250 FOR IP): Performed by: NURSE PRACTITIONER

## 2018-06-10 PROCEDURE — 85025 COMPLETE CBC W/AUTO DIFF WBC: CPT

## 2018-06-10 PROCEDURE — 97110 THERAPEUTIC EXERCISES: CPT

## 2018-06-10 PROCEDURE — 36415 COLL VENOUS BLD VENIPUNCTURE: CPT

## 2018-06-10 PROCEDURE — 6360000002 HC RX W HCPCS: Performed by: INTERNAL MEDICINE

## 2018-06-10 PROCEDURE — 94669 MECHANICAL CHEST WALL OSCILL: CPT

## 2018-06-10 PROCEDURE — 6360000002 HC RX W HCPCS: Performed by: NURSE PRACTITIONER

## 2018-06-10 PROCEDURE — 97530 THERAPEUTIC ACTIVITIES: CPT

## 2018-06-10 PROCEDURE — 1290000000 HC SEMI PRIVATE OTHER R&B

## 2018-06-10 PROCEDURE — 6370000000 HC RX 637 (ALT 250 FOR IP): Performed by: INTERNAL MEDICINE

## 2018-06-10 PROCEDURE — 80048 BASIC METABOLIC PNL TOTAL CA: CPT

## 2018-06-10 RX ORDER — FERROUS SULFATE 325(65) MG
325 TABLET ORAL 2 TIMES DAILY WITH MEALS
COMMUNITY
Start: 2018-06-10

## 2018-06-10 RX ORDER — ACETAMINOPHEN 325 MG/1
650 TABLET ORAL EVERY 4 HOURS PRN
COMMUNITY
Start: 2018-06-10

## 2018-06-10 RX ORDER — FLUDROCORTISONE ACETATE 0.1 MG/1
0.1 TABLET ORAL DAILY
Qty: 30 TABLET | Refills: 0 | Status: SHIPPED | OUTPATIENT
Start: 2018-06-11 | End: 2018-07-11

## 2018-06-10 RX ORDER — FERROUS SULFATE 325(65) MG
325 TABLET ORAL 2 TIMES DAILY WITH MEALS
Status: DISCONTINUED | OUTPATIENT
Start: 2018-06-10 | End: 2018-06-11 | Stop reason: HOSPADM

## 2018-06-10 RX ADMIN — MULTIPLE VITAMINS W/ MINERALS TAB 1 TABLET: TAB at 10:30

## 2018-06-10 RX ADMIN — FLUDROCORTISONE ACETATE 0.1 MG: 0.1 TABLET ORAL at 10:31

## 2018-06-10 RX ADMIN — IPRATROPIUM BROMIDE AND ALBUTEROL SULFATE 1 AMPULE: .5; 3 SOLUTION RESPIRATORY (INHALATION) at 18:35

## 2018-06-10 RX ADMIN — IPRATROPIUM BROMIDE AND ALBUTEROL SULFATE 1 AMPULE: .5; 3 SOLUTION RESPIRATORY (INHALATION) at 10:30

## 2018-06-10 RX ADMIN — METOPROLOL TARTRATE 50 MG: 50 TABLET, FILM COATED ORAL at 10:31

## 2018-06-10 RX ADMIN — PANTOPRAZOLE SODIUM 40 MG: 40 TABLET, DELAYED RELEASE ORAL at 05:47

## 2018-06-10 RX ADMIN — Medication 1 CAPSULE: at 10:30

## 2018-06-10 RX ADMIN — METOPROLOL TARTRATE 50 MG: 50 TABLET, FILM COATED ORAL at 20:45

## 2018-06-10 RX ADMIN — ENOXAPARIN SODIUM 30 MG: 30 INJECTION SUBCUTANEOUS at 16:36

## 2018-06-10 RX ADMIN — BUDESONIDE 500 MCG: 0.5 INHALANT RESPIRATORY (INHALATION) at 10:30

## 2018-06-10 RX ADMIN — ASPIRIN 81 MG 81 MG: 81 TABLET ORAL at 10:31

## 2018-06-10 RX ADMIN — GUAIFENESIN 1200 MG: 600 TABLET, EXTENDED RELEASE ORAL at 10:31

## 2018-06-10 RX ADMIN — FERROUS SULFATE TAB 325 MG (65 MG ELEMENTAL FE) 325 MG: 325 (65 FE) TAB at 16:36

## 2018-06-10 RX ADMIN — DOCUSATE SODIUM 100 MG: 100 CAPSULE, LIQUID FILLED ORAL at 16:36

## 2018-06-11 PROCEDURE — 94640 AIRWAY INHALATION TREATMENT: CPT

## 2018-06-11 PROCEDURE — 6370000000 HC RX 637 (ALT 250 FOR IP): Performed by: INTERNAL MEDICINE

## 2018-06-11 PROCEDURE — 6370000000 HC RX 637 (ALT 250 FOR IP): Performed by: NURSE PRACTITIONER

## 2018-06-11 PROCEDURE — 97530 THERAPEUTIC ACTIVITIES: CPT

## 2018-06-11 PROCEDURE — 6370000000 HC RX 637 (ALT 250 FOR IP): Performed by: FAMILY MEDICINE

## 2018-06-11 PROCEDURE — 94669 MECHANICAL CHEST WALL OSCILL: CPT

## 2018-06-11 PROCEDURE — 6360000002 HC RX W HCPCS: Performed by: NURSE PRACTITIONER

## 2018-06-11 PROCEDURE — 97535 SELF CARE MNGMENT TRAINING: CPT

## 2018-06-11 RX ADMIN — GUAIFENESIN 1200 MG: 600 TABLET, EXTENDED RELEASE ORAL at 09:22

## 2018-06-11 RX ADMIN — IPRATROPIUM BROMIDE AND ALBUTEROL SULFATE 1 AMPULE: .5; 3 SOLUTION RESPIRATORY (INHALATION) at 07:37

## 2018-06-11 RX ADMIN — METOPROLOL TARTRATE 50 MG: 50 TABLET, FILM COATED ORAL at 09:22

## 2018-06-11 RX ADMIN — MULTIPLE VITAMINS W/ MINERALS TAB 1 TABLET: TAB at 09:22

## 2018-06-11 RX ADMIN — ASPIRIN 81 MG 81 MG: 81 TABLET ORAL at 09:22

## 2018-06-11 RX ADMIN — Medication 1 CAPSULE: at 09:22

## 2018-06-11 RX ADMIN — BUDESONIDE 500 MCG: 0.5 INHALANT RESPIRATORY (INHALATION) at 07:47

## 2018-06-11 RX ADMIN — FLUDROCORTISONE ACETATE 0.1 MG: 0.1 TABLET ORAL at 09:22

## 2018-06-11 RX ADMIN — FERROUS SULFATE TAB 325 MG (65 MG ELEMENTAL FE) 325 MG: 325 (65 FE) TAB at 09:22

## 2018-06-11 RX ADMIN — PANTOPRAZOLE SODIUM 40 MG: 40 TABLET, DELAYED RELEASE ORAL at 09:23

## 2018-06-11 ASSESSMENT — PAIN SCALES - GENERAL: PAINLEVEL_OUTOF10: 0

## 2018-06-12 ENCOUNTER — TELEPHONE (OUTPATIENT)
Dept: PULMONOLOGY | Age: 83
End: 2018-06-12

## 2018-06-13 VITALS
HEIGHT: 63 IN | SYSTOLIC BLOOD PRESSURE: 177 MMHG | WEIGHT: 92.6 LBS | DIASTOLIC BLOOD PRESSURE: 82 MMHG | TEMPERATURE: 98.2 F | OXYGEN SATURATION: 94 % | HEART RATE: 69 BPM | RESPIRATION RATE: 19 BRPM | BODY MASS INDEX: 16.41 KG/M2

## 2018-06-15 ENCOUNTER — HOSPITAL ENCOUNTER (INPATIENT)
Age: 83
LOS: 4 days | Discharge: HOSPICE | DRG: 643 | End: 2018-06-19
Attending: FAMILY MEDICINE | Admitting: FAMILY MEDICINE
Payer: MEDICARE

## 2018-06-15 ENCOUNTER — APPOINTMENT (OUTPATIENT)
Dept: CT IMAGING | Age: 83
DRG: 643 | End: 2018-06-15
Payer: MEDICARE

## 2018-06-15 ENCOUNTER — APPOINTMENT (OUTPATIENT)
Dept: GENERAL RADIOLOGY | Age: 83
DRG: 643 | End: 2018-06-15
Payer: MEDICARE

## 2018-06-15 DIAGNOSIS — E87.1 HYPONATREMIA: ICD-10-CM

## 2018-06-15 DIAGNOSIS — F41.9 ANXIETY: ICD-10-CM

## 2018-06-15 DIAGNOSIS — J18.9 PNEUMONIA DUE TO ORGANISM: ICD-10-CM

## 2018-06-15 DIAGNOSIS — R53.1 GENERALIZED WEAKNESS: Primary | ICD-10-CM

## 2018-06-15 DIAGNOSIS — R07.2 PRECORDIAL PAIN: ICD-10-CM

## 2018-06-15 PROBLEM — M41.9 SEVERE SCOLIOSIS: Status: ACTIVE | Noted: 2018-06-15

## 2018-06-15 PROBLEM — E83.51 HYPOCALCEMIA: Status: ACTIVE | Noted: 2018-06-15

## 2018-06-15 PROBLEM — R53.83 FATIGUE: Status: ACTIVE | Noted: 2018-06-15

## 2018-06-15 PROBLEM — K59.00 CONSTIPATION: Status: ACTIVE | Noted: 2018-06-15

## 2018-06-15 LAB
ALBUMIN SERPL-MCNC: 3.3 G/DL (ref 3.5–5.1)
ALP BLD-CCNC: 87 U/L (ref 38–126)
ALT SERPL-CCNC: 14 U/L (ref 11–66)
AMMONIA: 29 UMOL/L (ref 11–60)
ANION GAP SERPL CALCULATED.3IONS-SCNC: 11 MEQ/L (ref 8–16)
ANISOCYTOSIS: ABNORMAL
AST SERPL-CCNC: 21 U/L (ref 5–40)
BASOPHILS # BLD: 0.2 %
BASOPHILS ABSOLUTE: 0 THOU/MM3 (ref 0–0.1)
BILIRUB SERPL-MCNC: 0.6 MG/DL (ref 0.3–1.2)
BILIRUBIN DIRECT: < 0.2 MG/DL (ref 0–0.3)
BUN BLDV-MCNC: 10 MG/DL (ref 7–22)
CALCIUM SERPL-MCNC: 7.9 MG/DL (ref 8.5–10.5)
CHLORIDE BLD-SCNC: 85 MEQ/L (ref 98–111)
CO2: 27 MEQ/L (ref 23–33)
CREAT SERPL-MCNC: 0.6 MG/DL (ref 0.4–1.2)
EKG ATRIAL RATE: 71 BPM
EKG P-R INTERVAL: 160 MS
EKG Q-T INTERVAL: 386 MS
EKG QRS DURATION: 94 MS
EKG QTC CALCULATION (BAZETT): 419 MS
EKG R AXIS: 68 DEGREES
EKG T AXIS: 63 DEGREES
EKG VENTRICULAR RATE: 71 BPM
EOSINOPHIL # BLD: 0.6 %
EOSINOPHILS ABSOLUTE: 0.1 THOU/MM3 (ref 0–0.4)
GFR SERPL CREATININE-BSD FRML MDRD: > 90 ML/MIN/1.73M2
GLUCOSE BLD-MCNC: 113 MG/DL (ref 70–108)
HCT VFR BLD CALC: 34.6 % (ref 37–47)
HEMOGLOBIN: 11.8 GM/DL (ref 12–16)
LACTIC ACID: 0.8 MMOL/L (ref 0.5–2.2)
LIPASE: 42 U/L (ref 5.6–51.3)
LYMPHOCYTES # BLD: 5.2 %
LYMPHOCYTES ABSOLUTE: 0.7 THOU/MM3 (ref 1–4.8)
MAGNESIUM: 1.8 MG/DL (ref 1.6–2.4)
MCH RBC QN AUTO: 30.1 PG (ref 27–31)
MCHC RBC AUTO-ENTMCNC: 34.1 GM/DL (ref 33–37)
MCV RBC AUTO: 88.2 FL (ref 81–99)
MONOCYTES # BLD: 6.8 %
MONOCYTES ABSOLUTE: 0.9 THOU/MM3 (ref 0.4–1.3)
NUCLEATED RED BLOOD CELLS: 0 /100 WBC
OSMOLALITY CALCULATION: 247.6 MOSMOL/KG (ref 275–300)
PDW BLD-RTO: 15 % (ref 11.5–14.5)
PLATELET # BLD: 281 THOU/MM3 (ref 130–400)
PMV BLD AUTO: 6.8 FL (ref 7.4–10.4)
POTASSIUM SERPL-SCNC: 4 MEQ/L (ref 3.5–5.2)
PRO-BNP: 3268 PG/ML (ref 0–1800)
PROCALCITONIN: 0.04 NG/ML (ref 0.01–0.09)
RBC # BLD: 3.92 MILL/MM3 (ref 4.2–5.4)
SEG NEUTROPHILS: 87.2 %
SEGMENTED NEUTROPHILS ABSOLUTE COUNT: 11.1 THOU/MM3 (ref 1.8–7.7)
SODIUM BLD-SCNC: 123 MEQ/L (ref 135–145)
TOTAL PROTEIN: 6.2 G/DL (ref 6.1–8)
TROPONIN T: < 0.01 NG/ML
TROPONIN T: < 0.01 NG/ML
WBC # BLD: 12.7 THOU/MM3 (ref 4.8–10.8)

## 2018-06-15 PROCEDURE — 2580000003 HC RX 258: Performed by: PHYSICIAN ASSISTANT

## 2018-06-15 PROCEDURE — 87641 MR-STAPH DNA AMP PROBE: CPT

## 2018-06-15 PROCEDURE — 87040 BLOOD CULTURE FOR BACTERIA: CPT

## 2018-06-15 PROCEDURE — 93005 ELECTROCARDIOGRAM TRACING: CPT | Performed by: PHYSICIAN ASSISTANT

## 2018-06-15 PROCEDURE — 80053 COMPREHEN METABOLIC PANEL: CPT

## 2018-06-15 PROCEDURE — 82248 BILIRUBIN DIRECT: CPT

## 2018-06-15 PROCEDURE — 83735 ASSAY OF MAGNESIUM: CPT

## 2018-06-15 PROCEDURE — A4614 HAND-HELD PEFR METER: HCPCS

## 2018-06-15 PROCEDURE — 83880 ASSAY OF NATRIURETIC PEPTIDE: CPT

## 2018-06-15 PROCEDURE — 82550 ASSAY OF CK (CPK): CPT

## 2018-06-15 PROCEDURE — 99223 1ST HOSP IP/OBS HIGH 75: CPT | Performed by: FAMILY MEDICINE

## 2018-06-15 PROCEDURE — 99285 EMERGENCY DEPT VISIT HI MDM: CPT

## 2018-06-15 PROCEDURE — 70450 CT HEAD/BRAIN W/O DYE: CPT

## 2018-06-15 PROCEDURE — 36415 COLL VENOUS BLD VENIPUNCTURE: CPT

## 2018-06-15 PROCEDURE — 6370000000 HC RX 637 (ALT 250 FOR IP): Performed by: PHYSICIAN ASSISTANT

## 2018-06-15 PROCEDURE — 71275 CT ANGIOGRAPHY CHEST: CPT

## 2018-06-15 PROCEDURE — 83690 ASSAY OF LIPASE: CPT

## 2018-06-15 PROCEDURE — 2060000000 HC ICU INTERMEDIATE R&B

## 2018-06-15 PROCEDURE — 87081 CULTURE SCREEN ONLY: CPT

## 2018-06-15 PROCEDURE — 84484 ASSAY OF TROPONIN QUANT: CPT

## 2018-06-15 PROCEDURE — 71045 X-RAY EXAM CHEST 1 VIEW: CPT

## 2018-06-15 PROCEDURE — 82140 ASSAY OF AMMONIA: CPT

## 2018-06-15 PROCEDURE — 74177 CT ABD & PELVIS W/CONTRAST: CPT

## 2018-06-15 PROCEDURE — 85025 COMPLETE CBC W/AUTO DIFF WBC: CPT

## 2018-06-15 PROCEDURE — 84145 PROCALCITONIN (PCT): CPT

## 2018-06-15 PROCEDURE — 83605 ASSAY OF LACTIC ACID: CPT

## 2018-06-15 PROCEDURE — 6360000004 HC RX CONTRAST MEDICATION: Performed by: PHYSICIAN ASSISTANT

## 2018-06-15 RX ORDER — BUDESONIDE 0.5 MG/2ML
500 INHALANT ORAL 2 TIMES DAILY
Status: DISCONTINUED | OUTPATIENT
Start: 2018-06-15 | End: 2018-06-19 | Stop reason: HOSPADM

## 2018-06-15 RX ORDER — ONDANSETRON 2 MG/ML
4 INJECTION INTRAMUSCULAR; INTRAVENOUS EVERY 6 HOURS PRN
Status: DISCONTINUED | OUTPATIENT
Start: 2018-06-15 | End: 2018-06-19 | Stop reason: HOSPADM

## 2018-06-15 RX ORDER — OMEPRAZOLE 20 MG/1
20 CAPSULE, DELAYED RELEASE ORAL DAILY
Status: DISCONTINUED | OUTPATIENT
Start: 2018-06-16 | End: 2018-06-15 | Stop reason: CLARIF

## 2018-06-15 RX ORDER — SODIUM CHLORIDE 0.9 % (FLUSH) 0.9 %
10 SYRINGE (ML) INJECTION PRN
Status: DISCONTINUED | OUTPATIENT
Start: 2018-06-15 | End: 2018-06-19 | Stop reason: HOSPADM

## 2018-06-15 RX ORDER — FERROUS SULFATE 325(65) MG
325 TABLET ORAL 2 TIMES DAILY WITH MEALS
Status: DISCONTINUED | OUTPATIENT
Start: 2018-06-16 | End: 2018-06-19 | Stop reason: HOSPADM

## 2018-06-15 RX ORDER — PANTOPRAZOLE SODIUM 40 MG/1
40 TABLET, DELAYED RELEASE ORAL
Status: DISCONTINUED | OUTPATIENT
Start: 2018-06-16 | End: 2018-06-19 | Stop reason: HOSPADM

## 2018-06-15 RX ORDER — DOCUSATE SODIUM 100 MG/1
100 CAPSULE, LIQUID FILLED ORAL 2 TIMES DAILY
Status: DISCONTINUED | OUTPATIENT
Start: 2018-06-15 | End: 2018-06-19 | Stop reason: HOSPADM

## 2018-06-15 RX ORDER — CETIRIZINE HYDROCHLORIDE 5 MG/1
5 TABLET ORAL DAILY
Status: DISCONTINUED | OUTPATIENT
Start: 2018-06-16 | End: 2018-06-19 | Stop reason: HOSPADM

## 2018-06-15 RX ORDER — M-VIT,TX,IRON,MINS/CALC/FOLIC 27MG-0.4MG
1 TABLET ORAL DAILY
Status: DISCONTINUED | OUTPATIENT
Start: 2018-06-16 | End: 2018-06-19 | Stop reason: HOSPADM

## 2018-06-15 RX ORDER — 0.9 % SODIUM CHLORIDE 0.9 %
1000 INTRAVENOUS SOLUTION INTRAVENOUS ONCE
Status: COMPLETED | OUTPATIENT
Start: 2018-06-15 | End: 2018-06-15

## 2018-06-15 RX ORDER — VITAMIN B COMPLEX
1 CAPSULE ORAL DAILY
Status: DISCONTINUED | OUTPATIENT
Start: 2018-06-16 | End: 2018-06-15 | Stop reason: SDUPTHER

## 2018-06-15 RX ORDER — ALBUTEROL SULFATE 2.5 MG/3ML
2.5 SOLUTION RESPIRATORY (INHALATION) EVERY 6 HOURS PRN
Status: DISCONTINUED | OUTPATIENT
Start: 2018-06-15 | End: 2018-06-19 | Stop reason: HOSPADM

## 2018-06-15 RX ORDER — METOPROLOL TARTRATE 50 MG/1
50 TABLET, FILM COATED ORAL ONCE
Status: COMPLETED | OUTPATIENT
Start: 2018-06-15 | End: 2018-06-15

## 2018-06-15 RX ORDER — ACETAMINOPHEN 325 MG/1
650 TABLET ORAL EVERY 4 HOURS PRN
Status: DISCONTINUED | OUTPATIENT
Start: 2018-06-15 | End: 2018-06-19 | Stop reason: HOSPADM

## 2018-06-15 RX ORDER — LORATADINE 10 MG/1
10 TABLET ORAL DAILY
Status: DISCONTINUED | OUTPATIENT
Start: 2018-06-16 | End: 2018-06-15 | Stop reason: CLARIF

## 2018-06-15 RX ORDER — LACTOBACILLUS RHAMNOSUS GG 10B CELL
1 CAPSULE ORAL DAILY
Status: DISCONTINUED | OUTPATIENT
Start: 2018-06-16 | End: 2018-06-19 | Stop reason: HOSPADM

## 2018-06-15 RX ORDER — ASPIRIN 81 MG/1
81 TABLET, CHEWABLE ORAL DAILY
Status: DISCONTINUED | OUTPATIENT
Start: 2018-06-16 | End: 2018-06-19 | Stop reason: HOSPADM

## 2018-06-15 RX ORDER — ACETAMINOPHEN 325 MG/1
650 TABLET ORAL ONCE
Status: COMPLETED | OUTPATIENT
Start: 2018-06-15 | End: 2018-06-15

## 2018-06-15 RX ORDER — SODIUM CHLORIDE 0.9 % (FLUSH) 0.9 %
10 SYRINGE (ML) INJECTION EVERY 12 HOURS SCHEDULED
Status: DISCONTINUED | OUTPATIENT
Start: 2018-06-15 | End: 2018-06-19 | Stop reason: HOSPADM

## 2018-06-15 RX ORDER — METOPROLOL TARTRATE 50 MG/1
50 TABLET, FILM COATED ORAL 2 TIMES DAILY
Status: DISCONTINUED | OUTPATIENT
Start: 2018-06-15 | End: 2018-06-19 | Stop reason: HOSPADM

## 2018-06-15 RX ADMIN — METOPROLOL TARTRATE 50 MG: 50 TABLET, FILM COATED ORAL at 19:45

## 2018-06-15 RX ADMIN — ACETAMINOPHEN 650 MG: 325 TABLET ORAL at 18:55

## 2018-06-15 RX ADMIN — SODIUM CHLORIDE 1000 ML: 9 INJECTION, SOLUTION INTRAVENOUS at 19:13

## 2018-06-15 RX ADMIN — IOPAMIDOL 80 ML: 755 INJECTION, SOLUTION INTRAVENOUS at 19:53

## 2018-06-15 ASSESSMENT — ENCOUNTER SYMPTOMS
WHEEZING: 0
RHINORRHEA: 0
SHORTNESS OF BREATH: 0
SORE THROAT: 0
CONSTIPATION: 0
ABDOMINAL PAIN: 0
VOMITING: 0
BACK PAIN: 0
COUGH: 1
EYE DISCHARGE: 0
EYE REDNESS: 0
DIARRHEA: 0
COLOR CHANGE: 0
NAUSEA: 0

## 2018-06-15 ASSESSMENT — PAIN SCALES - GENERAL: PAINLEVEL_OUTOF10: 0

## 2018-06-16 LAB
ANION GAP SERPL CALCULATED.3IONS-SCNC: 13 MEQ/L (ref 8–16)
ANISOCYTOSIS: ABNORMAL
BACTERIA: ABNORMAL /HPF
BASOPHILS # BLD: 0.3 %
BASOPHILS ABSOLUTE: 0 THOU/MM3 (ref 0–0.1)
BILIRUBIN URINE: NEGATIVE
BLOOD, URINE: NEGATIVE
BUN BLDV-MCNC: 8 MG/DL (ref 7–22)
CALCIUM SERPL-MCNC: 7.4 MG/DL (ref 8.5–10.5)
CASTS 2: ABNORMAL /LPF
CASTS UA: ABNORMAL /LPF
CHARACTER, URINE: CLEAR
CHLORIDE BLD-SCNC: 86 MEQ/L (ref 98–111)
CO2: 23 MEQ/L (ref 23–33)
COLOR: YELLOW
CREAT SERPL-MCNC: 0.5 MG/DL (ref 0.4–1.2)
CRYSTALS, UA: ABNORMAL
EOSINOPHIL # BLD: 1.6 %
EOSINOPHILS ABSOLUTE: 0.1 THOU/MM3 (ref 0–0.4)
EPITHELIAL CELLS, UA: ABNORMAL /HPF
GFR SERPL CREATININE-BSD FRML MDRD: > 90 ML/MIN/1.73M2
GLUCOSE BLD-MCNC: 107 MG/DL (ref 70–108)
GLUCOSE URINE: NEGATIVE MG/DL
HCT VFR BLD CALC: 31.3 % (ref 37–47)
HEMOGLOBIN: 10.5 GM/DL (ref 12–16)
INR BLD: 1.03 (ref 0.85–1.13)
KETONES, URINE: NEGATIVE
LEUKOCYTE ESTERASE, URINE: NEGATIVE
LYMPHOCYTES # BLD: 7.8 %
LYMPHOCYTES ABSOLUTE: 0.7 THOU/MM3 (ref 1–4.8)
MCH RBC QN AUTO: 29.4 PG (ref 27–31)
MCHC RBC AUTO-ENTMCNC: 33.4 GM/DL (ref 33–37)
MCV RBC AUTO: 88.1 FL (ref 81–99)
MISCELLANEOUS 2: ABNORMAL
MONOCYTES # BLD: 8.7 %
MONOCYTES ABSOLUTE: 0.8 THOU/MM3 (ref 0.4–1.3)
MRSA SCREEN RT-PCR: NEGATIVE
NITRITE, URINE: NEGATIVE
NUCLEATED RED BLOOD CELLS: 0 /100 WBC
OSMOLALITY URINE: 431 MOSMOL/KG (ref 250–750)
PDW BLD-RTO: 15 % (ref 11.5–14.5)
PH UA: 7
PLATELET # BLD: 224 THOU/MM3 (ref 130–400)
PMV BLD AUTO: 7.1 FL (ref 7.4–10.4)
POTASSIUM REFLEX MAGNESIUM: 3.8 MEQ/L (ref 3.5–5.2)
PROTEIN UA: ABNORMAL
RBC # BLD: 3.56 MILL/MM3 (ref 4.2–5.4)
RBC URINE: ABNORMAL /HPF
RENAL EPITHELIAL, UA: ABNORMAL
SEG NEUTROPHILS: 81.6 %
SEGMENTED NEUTROPHILS ABSOLUTE COUNT: 7.2 THOU/MM3 (ref 1.8–7.7)
SODIUM BLD-SCNC: 121 MEQ/L (ref 135–145)
SODIUM BLD-SCNC: 122 MEQ/L (ref 135–145)
SODIUM BLD-SCNC: 124 MEQ/L (ref 135–145)
SODIUM URINE: 98 MEQ/L
SPECIFIC GRAVITY, URINE: > 1.03 (ref 1–1.03)
TOTAL CK: 99 U/L (ref 30–135)
TROPONIN T: < 0.01 NG/ML
TROPONIN T: < 0.01 NG/ML
TSH SERPL DL<=0.05 MIU/L-ACNC: 3.09 UIU/ML (ref 0.4–4.2)
UROBILINOGEN, URINE: 0.2 EU/DL
WBC # BLD: 8.8 THOU/MM3 (ref 4.8–10.8)
WBC UA: ABNORMAL /HPF
YEAST: ABNORMAL

## 2018-06-16 PROCEDURE — 2500000003 HC RX 250 WO HCPCS: Performed by: FAMILY MEDICINE

## 2018-06-16 PROCEDURE — 80048 BASIC METABOLIC PNL TOTAL CA: CPT

## 2018-06-16 PROCEDURE — 81001 URINALYSIS AUTO W/SCOPE: CPT

## 2018-06-16 PROCEDURE — 83935 ASSAY OF URINE OSMOLALITY: CPT

## 2018-06-16 PROCEDURE — 2060000000 HC ICU INTERMEDIATE R&B

## 2018-06-16 PROCEDURE — 36415 COLL VENOUS BLD VENIPUNCTURE: CPT

## 2018-06-16 PROCEDURE — 99233 SBSQ HOSP IP/OBS HIGH 50: CPT | Performed by: HOSPITALIST

## 2018-06-16 PROCEDURE — 87899 AGENT NOS ASSAY W/OPTIC: CPT

## 2018-06-16 PROCEDURE — 93010 ELECTROCARDIOGRAM REPORT: CPT | Performed by: INTERNAL MEDICINE

## 2018-06-16 PROCEDURE — 2580000003 HC RX 258: Performed by: FAMILY MEDICINE

## 2018-06-16 PROCEDURE — 6370000000 HC RX 637 (ALT 250 FOR IP): Performed by: HOSPITALIST

## 2018-06-16 PROCEDURE — 94640 AIRWAY INHALATION TREATMENT: CPT

## 2018-06-16 PROCEDURE — 85025 COMPLETE CBC W/AUTO DIFF WBC: CPT

## 2018-06-16 PROCEDURE — 87449 NOS EACH ORGANISM AG IA: CPT

## 2018-06-16 PROCEDURE — 6360000002 HC RX W HCPCS: Performed by: FAMILY MEDICINE

## 2018-06-16 PROCEDURE — 85610 PROTHROMBIN TIME: CPT

## 2018-06-16 PROCEDURE — 84295 ASSAY OF SERUM SODIUM: CPT

## 2018-06-16 PROCEDURE — 84443 ASSAY THYROID STIM HORMONE: CPT

## 2018-06-16 PROCEDURE — 84300 ASSAY OF URINE SODIUM: CPT

## 2018-06-16 PROCEDURE — 84484 ASSAY OF TROPONIN QUANT: CPT

## 2018-06-16 PROCEDURE — 6370000000 HC RX 637 (ALT 250 FOR IP): Performed by: FAMILY MEDICINE

## 2018-06-16 RX ORDER — GUAIFENESIN 600 MG/1
600 TABLET, EXTENDED RELEASE ORAL 2 TIMES DAILY
Status: DISCONTINUED | OUTPATIENT
Start: 2018-06-16 | End: 2018-06-19 | Stop reason: HOSPADM

## 2018-06-16 RX ORDER — HYDRALAZINE HYDROCHLORIDE 20 MG/ML
10 INJECTION INTRAMUSCULAR; INTRAVENOUS ONCE
Status: DISCONTINUED | OUTPATIENT
Start: 2018-06-16 | End: 2018-06-16

## 2018-06-16 RX ORDER — POLYETHYLENE GLYCOL 3350 17 G/17G
17 POWDER, FOR SOLUTION ORAL DAILY
Status: DISCONTINUED | OUTPATIENT
Start: 2018-06-16 | End: 2018-06-19 | Stop reason: HOSPADM

## 2018-06-16 RX ORDER — LABETALOL HYDROCHLORIDE 5 MG/ML
5 INJECTION, SOLUTION INTRAVENOUS ONCE
Status: COMPLETED | OUTPATIENT
Start: 2018-06-16 | End: 2018-06-16

## 2018-06-16 RX ORDER — HYDRALAZINE HYDROCHLORIDE 20 MG/ML
5 INJECTION INTRAMUSCULAR; INTRAVENOUS ONCE
Status: DISCONTINUED | OUTPATIENT
Start: 2018-06-16 | End: 2018-06-16

## 2018-06-16 RX ORDER — CLONIDINE HYDROCHLORIDE 0.2 MG/1
0.2 TABLET ORAL ONCE
Status: COMPLETED | OUTPATIENT
Start: 2018-06-16 | End: 2018-06-16

## 2018-06-16 RX ADMIN — METOPROLOL TARTRATE 50 MG: 50 TABLET, FILM COATED ORAL at 20:34

## 2018-06-16 RX ADMIN — ASPIRIN 81 MG 81 MG: 81 TABLET ORAL at 08:15

## 2018-06-16 RX ADMIN — POLYETHYLENE GLYCOL 3350 17 G: 17 POWDER, FOR SOLUTION ORAL at 14:29

## 2018-06-16 RX ADMIN — DOCUSATE SODIUM 100 MG: 100 CAPSULE, LIQUID FILLED ORAL at 08:15

## 2018-06-16 RX ADMIN — METOPROLOL TARTRATE 50 MG: 50 TABLET, FILM COATED ORAL at 02:52

## 2018-06-16 RX ADMIN — MULTIPLE VITAMINS W/ MINERALS TAB 1 TABLET: TAB at 08:15

## 2018-06-16 RX ADMIN — METOPROLOL TARTRATE 50 MG: 50 TABLET, FILM COATED ORAL at 00:14

## 2018-06-16 RX ADMIN — BUDESONIDE 500 MCG: 0.5 INHALANT RESPIRATORY (INHALATION) at 08:03

## 2018-06-16 RX ADMIN — DOCUSATE SODIUM 100 MG: 100 CAPSULE, LIQUID FILLED ORAL at 00:16

## 2018-06-16 RX ADMIN — CETIRIZINE HYDROCHLORIDE 5 MG: 5 TABLET, FILM COATED ORAL at 10:18

## 2018-06-16 RX ADMIN — CLONIDINE HYDROCHLORIDE 0.2 MG: 0.2 TABLET ORAL at 10:18

## 2018-06-16 RX ADMIN — PANTOPRAZOLE SODIUM 40 MG: 40 TABLET, DELAYED RELEASE ORAL at 07:08

## 2018-06-16 RX ADMIN — CEFTRIAXONE SODIUM 1 G: 1 INJECTION, POWDER, FOR SOLUTION INTRAMUSCULAR; INTRAVENOUS at 00:10

## 2018-06-16 RX ADMIN — Medication 10 ML: at 08:15

## 2018-06-16 RX ADMIN — GUAIFENESIN 600 MG: 600 TABLET, EXTENDED RELEASE ORAL at 14:29

## 2018-06-16 RX ADMIN — BUDESONIDE 500 MCG: 0.5 INHALANT RESPIRATORY (INHALATION) at 21:27

## 2018-06-16 RX ADMIN — Medication 1 CAPSULE: at 08:15

## 2018-06-16 RX ADMIN — Medication 10 ML: at 20:34

## 2018-06-16 RX ADMIN — GUAIFENESIN 600 MG: 600 TABLET, EXTENDED RELEASE ORAL at 20:34

## 2018-06-16 RX ADMIN — FERROUS SULFATE TAB 325 MG (65 MG ELEMENTAL FE) 325 MG: 325 (65 FE) TAB at 17:54

## 2018-06-16 RX ADMIN — FERROUS SULFATE TAB 325 MG (65 MG ELEMENTAL FE) 325 MG: 325 (65 FE) TAB at 08:15

## 2018-06-16 RX ADMIN — VANCOMYCIN HYDROCHLORIDE 750 MG: 1 INJECTION, POWDER, LYOPHILIZED, FOR SOLUTION INTRAVENOUS at 01:17

## 2018-06-16 RX ADMIN — DOCUSATE SODIUM 100 MG: 100 CAPSULE, LIQUID FILLED ORAL at 20:34

## 2018-06-16 RX ADMIN — BISACODYL 5 MG: 5 TABLET, COATED ORAL at 14:29

## 2018-06-16 RX ADMIN — LABETALOL HYDROCHLORIDE 5 MG: 5 INJECTION INTRAVENOUS at 23:01

## 2018-06-16 RX ADMIN — Medication 10 ML: at 00:18

## 2018-06-17 LAB
ANION GAP SERPL CALCULATED.3IONS-SCNC: 11 MEQ/L (ref 8–16)
ANISOCYTOSIS: ABNORMAL
BASOPHILS # BLD: 0.4 %
BASOPHILS ABSOLUTE: 0 THOU/MM3 (ref 0–0.1)
BUN BLDV-MCNC: 9 MG/DL (ref 7–22)
CALCIUM SERPL-MCNC: 7.5 MG/DL (ref 8.5–10.5)
CHLORIDE BLD-SCNC: 86 MEQ/L (ref 98–111)
CO2: 24 MEQ/L (ref 23–33)
CREAT SERPL-MCNC: 0.5 MG/DL (ref 0.4–1.2)
EOSINOPHIL # BLD: 1.6 %
EOSINOPHILS ABSOLUTE: 0.1 THOU/MM3 (ref 0–0.4)
GFR SERPL CREATININE-BSD FRML MDRD: > 90 ML/MIN/1.73M2
GLUCOSE BLD-MCNC: 104 MG/DL (ref 70–108)
HCT VFR BLD CALC: 31.7 % (ref 37–47)
HEMOGLOBIN: 10.7 GM/DL (ref 12–16)
LEGIONELLA URINARY AG: NEGATIVE
LYMPHOCYTES # BLD: 5.9 %
LYMPHOCYTES ABSOLUTE: 0.5 THOU/MM3 (ref 1–4.8)
MCH RBC QN AUTO: 29.8 PG (ref 27–31)
MCHC RBC AUTO-ENTMCNC: 33.7 GM/DL (ref 33–37)
MCV RBC AUTO: 88.5 FL (ref 81–99)
MONOCYTES # BLD: 8.4 %
MONOCYTES ABSOLUTE: 0.7 THOU/MM3 (ref 0.4–1.3)
NUCLEATED RED BLOOD CELLS: 0 /100 WBC
PDW BLD-RTO: 16.1 % (ref 11.5–14.5)
PLATELET # BLD: 245 THOU/MM3 (ref 130–400)
PMV BLD AUTO: 7 FL (ref 7.4–10.4)
POTASSIUM SERPL-SCNC: 3.7 MEQ/L (ref 3.5–5.2)
RBC # BLD: 3.58 MILL/MM3 (ref 4.2–5.4)
SEG NEUTROPHILS: 83.7 %
SEGMENTED NEUTROPHILS ABSOLUTE COUNT: 7.4 THOU/MM3 (ref 1.8–7.7)
SODIUM BLD-SCNC: 119 MEQ/L (ref 135–145)
SODIUM BLD-SCNC: 121 MEQ/L (ref 135–145)
SODIUM BLD-SCNC: 121 MEQ/L (ref 135–145)
STREP PNEUMO AG, UR: NEGATIVE
WBC # BLD: 8.8 THOU/MM3 (ref 4.8–10.8)

## 2018-06-17 PROCEDURE — 2060000000 HC ICU INTERMEDIATE R&B

## 2018-06-17 PROCEDURE — 6370000000 HC RX 637 (ALT 250 FOR IP): Performed by: HOSPITALIST

## 2018-06-17 PROCEDURE — 99233 SBSQ HOSP IP/OBS HIGH 50: CPT | Performed by: HOSPITALIST

## 2018-06-17 PROCEDURE — 6370000000 HC RX 637 (ALT 250 FOR IP): Performed by: INTERNAL MEDICINE

## 2018-06-17 PROCEDURE — 94640 AIRWAY INHALATION TREATMENT: CPT

## 2018-06-17 PROCEDURE — 80048 BASIC METABOLIC PNL TOTAL CA: CPT

## 2018-06-17 PROCEDURE — 94669 MECHANICAL CHEST WALL OSCILL: CPT

## 2018-06-17 PROCEDURE — 99221 1ST HOSP IP/OBS SF/LOW 40: CPT | Performed by: INTERNAL MEDICINE

## 2018-06-17 PROCEDURE — 2580000003 HC RX 258: Performed by: FAMILY MEDICINE

## 2018-06-17 PROCEDURE — 6370000000 HC RX 637 (ALT 250 FOR IP): Performed by: FAMILY MEDICINE

## 2018-06-17 PROCEDURE — 6360000002 HC RX W HCPCS: Performed by: FAMILY MEDICINE

## 2018-06-17 PROCEDURE — 36415 COLL VENOUS BLD VENIPUNCTURE: CPT

## 2018-06-17 PROCEDURE — 85025 COMPLETE CBC W/AUTO DIFF WBC: CPT

## 2018-06-17 PROCEDURE — 84295 ASSAY OF SERUM SODIUM: CPT

## 2018-06-17 RX ORDER — SODIUM CHLORIDE 1000 MG
2 TABLET, SOLUBLE MISCELLANEOUS 2 TIMES DAILY WITH MEALS
Status: DISCONTINUED | OUTPATIENT
Start: 2018-06-17 | End: 2018-06-19 | Stop reason: HOSPADM

## 2018-06-17 RX ORDER — TOLVAPTAN 15 MG/1
15 TABLET ORAL ONCE
Status: COMPLETED | OUTPATIENT
Start: 2018-06-17 | End: 2018-06-17

## 2018-06-17 RX ORDER — DOXAZOSIN MESYLATE 1 MG/1
1 TABLET ORAL EVERY 12 HOURS SCHEDULED
Status: DISCONTINUED | OUTPATIENT
Start: 2018-06-17 | End: 2018-06-17

## 2018-06-17 RX ORDER — HYDRALAZINE HYDROCHLORIDE 25 MG/1
25 TABLET, FILM COATED ORAL EVERY 8 HOURS SCHEDULED
Status: DISCONTINUED | OUTPATIENT
Start: 2018-06-17 | End: 2018-06-19 | Stop reason: HOSPADM

## 2018-06-17 RX ADMIN — SODIUM CHLORIDE TAB 1 GM 2 G: 1 TAB at 16:30

## 2018-06-17 RX ADMIN — NITROGLYCERIN 0.5 INCH: 20 OINTMENT TOPICAL at 05:52

## 2018-06-17 RX ADMIN — ALBUTEROL SULFATE 2.5 MG: 2.5 SOLUTION RESPIRATORY (INHALATION) at 01:51

## 2018-06-17 RX ADMIN — BISACODYL 5 MG: 5 TABLET, COATED ORAL at 10:24

## 2018-06-17 RX ADMIN — PANTOPRAZOLE SODIUM 40 MG: 40 TABLET, DELAYED RELEASE ORAL at 05:52

## 2018-06-17 RX ADMIN — FERROUS SULFATE TAB 325 MG (65 MG ELEMENTAL FE) 325 MG: 325 (65 FE) TAB at 16:30

## 2018-06-17 RX ADMIN — MULTIPLE VITAMINS W/ MINERALS TAB 1 TABLET: TAB at 10:24

## 2018-06-17 RX ADMIN — HYDRALAZINE HYDROCHLORIDE 25 MG: 25 TABLET, FILM COATED ORAL at 10:24

## 2018-06-17 RX ADMIN — DOCUSATE SODIUM 100 MG: 100 CAPSULE, LIQUID FILLED ORAL at 10:24

## 2018-06-17 RX ADMIN — BUDESONIDE 500 MCG: 0.5 INHALANT RESPIRATORY (INHALATION) at 09:33

## 2018-06-17 RX ADMIN — CETIRIZINE HYDROCHLORIDE 5 MG: 5 TABLET, FILM COATED ORAL at 10:24

## 2018-06-17 RX ADMIN — ACETAMINOPHEN 650 MG: 325 TABLET ORAL at 01:05

## 2018-06-17 RX ADMIN — Medication 1 CAPSULE: at 10:24

## 2018-06-17 RX ADMIN — ASPIRIN 81 MG 81 MG: 81 TABLET ORAL at 10:24

## 2018-06-17 RX ADMIN — GUAIFENESIN 600 MG: 600 TABLET, EXTENDED RELEASE ORAL at 10:24

## 2018-06-17 RX ADMIN — DOXAZOSIN 1 MG: 1 TABLET ORAL at 05:52

## 2018-06-17 RX ADMIN — METOPROLOL TARTRATE 50 MG: 50 TABLET, FILM COATED ORAL at 10:24

## 2018-06-17 RX ADMIN — TOLVAPTAN 15 MG: 15 TABLET ORAL at 10:24

## 2018-06-17 RX ADMIN — NITROGLYCERIN 0.5 INCH: 20 OINTMENT TOPICAL at 01:31

## 2018-06-17 RX ADMIN — FERROUS SULFATE TAB 325 MG (65 MG ELEMENTAL FE) 325 MG: 325 (65 FE) TAB at 10:24

## 2018-06-17 RX ADMIN — Medication 10 ML: at 10:24

## 2018-06-17 ASSESSMENT — PAIN SCALES - GENERAL: PAINLEVEL_OUTOF10: 5

## 2018-06-18 LAB
MRSA SCREEN: NORMAL
SODIUM BLD-SCNC: 131 MEQ/L (ref 135–145)
VRE CULTURE: NORMAL

## 2018-06-18 PROCEDURE — G8989 SELF CARE D/C STATUS: HCPCS

## 2018-06-18 PROCEDURE — 97165 OT EVAL LOW COMPLEX 30 MIN: CPT

## 2018-06-18 PROCEDURE — 97535 SELF CARE MNGMENT TRAINING: CPT

## 2018-06-18 PROCEDURE — 6360000002 HC RX W HCPCS: Performed by: FAMILY MEDICINE

## 2018-06-18 PROCEDURE — 94640 AIRWAY INHALATION TREATMENT: CPT

## 2018-06-18 PROCEDURE — 99232 SBSQ HOSP IP/OBS MODERATE 35: CPT | Performed by: NURSE PRACTITIONER

## 2018-06-18 PROCEDURE — 36415 COLL VENOUS BLD VENIPUNCTURE: CPT

## 2018-06-18 PROCEDURE — 99232 SBSQ HOSP IP/OBS MODERATE 35: CPT | Performed by: HOSPITALIST

## 2018-06-18 PROCEDURE — G8988 SELF CARE GOAL STATUS: HCPCS

## 2018-06-18 PROCEDURE — 84295 ASSAY OF SERUM SODIUM: CPT

## 2018-06-18 PROCEDURE — 2060000000 HC ICU INTERMEDIATE R&B

## 2018-06-18 PROCEDURE — G8987 SELF CARE CURRENT STATUS: HCPCS

## 2018-06-18 RX ADMIN — BUDESONIDE 500 MCG: 0.5 INHALANT RESPIRATORY (INHALATION) at 21:57

## 2018-06-18 ASSESSMENT — PAIN SCALES - GENERAL: PAINLEVEL_OUTOF10: 0

## 2018-06-19 ENCOUNTER — TELEPHONE (OUTPATIENT)
Dept: INTERNAL MEDICINE CLINIC | Age: 83
End: 2018-06-19

## 2018-06-19 VITALS
RESPIRATION RATE: 16 BRPM | WEIGHT: 94.8 LBS | HEART RATE: 88 BPM | OXYGEN SATURATION: 92 % | DIASTOLIC BLOOD PRESSURE: 93 MMHG | TEMPERATURE: 97.5 F | SYSTOLIC BLOOD PRESSURE: 188 MMHG | BODY MASS INDEX: 19.11 KG/M2 | HEIGHT: 59 IN

## 2018-06-19 LAB — SODIUM BLD-SCNC: 133 MEQ/L (ref 135–145)

## 2018-06-19 PROCEDURE — 6360000002 HC RX W HCPCS: Performed by: FAMILY MEDICINE

## 2018-06-19 PROCEDURE — 36415 COLL VENOUS BLD VENIPUNCTURE: CPT

## 2018-06-19 PROCEDURE — 94640 AIRWAY INHALATION TREATMENT: CPT

## 2018-06-19 PROCEDURE — 84295 ASSAY OF SERUM SODIUM: CPT

## 2018-06-19 PROCEDURE — 94669 MECHANICAL CHEST WALL OSCILL: CPT

## 2018-06-19 RX ORDER — MORPHINE SULFATE 20 MG/ML
2.5 SOLUTION ORAL EVERY 4 HOURS PRN
Status: DISCONTINUED | OUTPATIENT
Start: 2018-06-19 | End: 2018-06-19 | Stop reason: HOSPADM

## 2018-06-19 RX ORDER — HYDRALAZINE HYDROCHLORIDE 25 MG/1
25 TABLET, FILM COATED ORAL EVERY 8 HOURS SCHEDULED
Qty: 90 TABLET | Refills: 3 | Status: SHIPPED | OUTPATIENT
Start: 2018-06-19

## 2018-06-19 RX ORDER — LORAZEPAM 2 MG/ML
0.5 CONCENTRATE ORAL EVERY 4 HOURS PRN
Qty: 30 ML | Refills: 0
Start: 2018-06-19 | End: 2018-07-03

## 2018-06-19 RX ORDER — SODIUM CHLORIDE 1000 MG
2 TABLET, SOLUBLE MISCELLANEOUS 2 TIMES DAILY WITH MEALS
Qty: 90 TABLET | Refills: 3 | Status: SHIPPED | OUTPATIENT
Start: 2018-06-19

## 2018-06-19 RX ORDER — MORPHINE SULFATE 20 MG/ML
2.5 SOLUTION ORAL EVERY 4 HOURS PRN
Qty: 5 ML | Refills: 0
Start: 2018-06-19 | End: 2018-06-22

## 2018-06-19 RX ADMIN — BUDESONIDE 500 MCG: 0.5 INHALANT RESPIRATORY (INHALATION) at 09:03

## 2018-06-21 LAB
BLOOD CULTURE, ROUTINE: NORMAL
BLOOD CULTURE, ROUTINE: NORMAL

## 2019-01-01 NOTE — PROGRESS NOTES
Patient and family having second thoiughts about a bronchoscopy and want to speak to Dr Jacqualin Schwab. Dr Jacqualin Schwab notified and he will be here at [facility] between 7-8 AM tomorrow to discuss the bronchoscopy. Will be NPO after midnight for possible procedure tomorrow. IV intact

## 2020-11-03 PROBLEM — J18.9 PNEUMONIA OF LEFT LOWER LOBE DUE TO INFECTIOUS ORGANISM: Status: RESOLVED | Noted: 2020-11-03 | Resolved: 2020-11-03
